# Patient Record
Sex: FEMALE | Race: BLACK OR AFRICAN AMERICAN | NOT HISPANIC OR LATINO | Employment: FULL TIME | URBAN - METROPOLITAN AREA
[De-identification: names, ages, dates, MRNs, and addresses within clinical notes are randomized per-mention and may not be internally consistent; named-entity substitution may affect disease eponyms.]

---

## 2017-12-29 ENCOUNTER — OFFICE VISIT (OUTPATIENT)
Dept: RETAIL CLINIC | Facility: CLINIC | Age: 57
End: 2017-12-29

## 2017-12-29 VITALS
RESPIRATION RATE: 16 BRPM | OXYGEN SATURATION: 98 % | HEIGHT: 67 IN | WEIGHT: 242 LBS | TEMPERATURE: 98.9 F | BODY MASS INDEX: 37.98 KG/M2 | HEART RATE: 85 BPM

## 2017-12-29 DIAGNOSIS — J40 BRONCHITIS: ICD-10-CM

## 2017-12-29 DIAGNOSIS — J01.41 ACUTE RECURRENT PANSINUSITIS: Primary | ICD-10-CM

## 2017-12-29 PROCEDURE — 99213 OFFICE O/P EST LOW 20 MIN: CPT | Performed by: NURSE PRACTITIONER

## 2017-12-29 RX ORDER — ALBUTEROL SULFATE 2.5 MG/3ML
2.5 SOLUTION RESPIRATORY (INHALATION) EVERY 4 HOURS PRN
COMMUNITY

## 2017-12-29 RX ORDER — ALBUTEROL SULFATE 90 UG/1
2 AEROSOL, METERED RESPIRATORY (INHALATION) EVERY 4 HOURS PRN
COMMUNITY

## 2017-12-29 RX ORDER — FLUTICASONE PROPIONATE 50 MCG
2 SPRAY, SUSPENSION (ML) NASAL NIGHTLY
Qty: 1 BOTTLE | Refills: 0 | Status: SHIPPED | OUTPATIENT
Start: 2017-12-29 | End: 2018-01-28

## 2017-12-29 RX ORDER — DEXTROMETHORPHAN HYDROBROMIDE AND PROMETHAZINE HYDROCHLORIDE 15; 6.25 MG/5ML; MG/5ML
5 SYRUP ORAL 4 TIMES DAILY PRN
Qty: 240 ML | Refills: 0 | Status: SHIPPED | OUTPATIENT
Start: 2017-12-29 | End: 2018-01-08

## 2017-12-29 RX ORDER — AZITHROMYCIN 250 MG/1
TABLET, FILM COATED ORAL
Qty: 6 TABLET | Refills: 0 | Status: SHIPPED | OUTPATIENT
Start: 2017-12-29 | End: 2018-03-30

## 2017-12-29 NOTE — PATIENT INSTRUCTIONS
Sinusitis, Adult  Sinusitis is soreness and inflammation of your sinuses. Sinuses are hollow spaces in the bones around your face. Your sinuses are located:  · Around your eyes.  · In the middle of your forehead.  · Behind your nose.  · In your cheekbones.  Your sinuses and nasal passages are lined with a stringy fluid (mucus). Mucus normally drains out of your sinuses. When your nasal tissues become inflamed or swollen, the mucus can become trapped or blocked so air cannot flow through your sinuses. This allows bacteria, viruses, and funguses to grow, which leads to infection.  Sinusitis can develop quickly and last for 7-10 days (acute) or for more than 12 weeks (chronic). Sinusitis often develops after a cold.  CAUSES  This condition is caused by anything that creates swelling in the sinuses or stops mucus from draining, including:  · Allergies.  · Asthma.  · Bacterial or viral infection.  · Abnormally shaped bones between the nasal passages.  · Nasal growths that contain mucus (nasal polyps).  · Narrow sinus openings.  · Pollutants, such as chemicals or irritants in the air.  · A foreign object stuck in the nose.  · A fungal infection. This is rare.  RISK FACTORS  The following factors may make you more likely to develop this condition:  · Having allergies or asthma.  · Having had a recent cold or respiratory tract infection.  · Having structural deformities or blockages in your nose or sinuses.  · Having a weak immune system.  · Doing a lot of swimming or diving.  · Overusing nasal sprays.  · Smoking.  SYMPTOMS  The main symptoms of this condition are pain and a feeling of pressure around the affected sinuses. Other symptoms include:  · Upper toothache.  · Earache.  · Headache.  · Bad breath.  · Decreased sense of smell and taste.  · A cough that may get worse at night.  · Fatigue.  · Fever.  · Thick drainage from your nose. The drainage is often green and it may contain pus (purulent).  · Stuffy nose or  congestion.  · Postnasal drip. This is when extra mucus collects in the throat or back of the nose.  · Swelling and warmth over the affected sinuses.  · Sore throat.  · Sensitivity to light.  DIAGNOSIS  This condition is diagnosed based on symptoms, a medical history, and a physical exam. To find out if your condition is acute or chronic, your health care provider may:  · Look in your nose for signs of nasal polyps.  · Tap over the affected sinus to check for signs of infection.  · View the inside of your sinuses using an imaging device that has a light attached (endoscope).  If your health care provider suspects that you have chronic sinusitis, you may also:  · Be tested for allergies.  · Have a sample of mucus taken from your nose (nasal culture) and checked for bacteria.  · Have a mucus sample examined to see if your sinusitis is related to an allergy.  If your sinusitis does not respond to treatment and it lasts longer than 8 weeks, you may have an MRI or CT scan to check your sinuses. These scans also help to determine how severe your infection is.  In rare cases, a bone biopsy may be done to rule out more serious types of fungal sinus disease.  TREATMENT  Treatment for sinusitis depends on the cause and whether your condition is chronic or acute. If a virus is causing your sinusitis, your symptoms will go away on their own within 10 days. You may be given medicines to relieve your symptoms, including:  · Topical nasal decongestants. They shrink swollen nasal passages and let mucus drain from your sinuses.  · Antihistamines. These drugs block inflammation that is triggered by allergies. This can help to ease swelling in your nose and sinuses.  · Topical nasal corticosteroids. These are nasal sprays that ease inflammation and swelling in your nose and sinuses.  · Nasal saline washes. These rinses can help to get rid of thick mucus in your nose.  If your condition is caused by bacteria, you will be given an  antibiotic medicine. If your condition is caused by a fungus, you will be given an antifungal medicine.  Surgery may be needed to correct underlying conditions, such as narrow nasal passages. Surgery may also be needed to remove polyps.  HOME CARE INSTRUCTIONS  Medicines  · Take, use, or apply over-the-counter and prescription medicines only as told by your health care provider. These may include nasal sprays.  · If you were prescribed an antibiotic medicine, take it as told by your health care provider. Do not stop taking the antibiotic even if you start to feel better.  Hydrate and Humidify  · Drink enough water to keep your urine clear or pale yellow. Staying hydrated will help to thin your mucus.  · Use a cool mist humidifier to keep the humidity level in your home above 50%.  · Inhale steam for 10-15 minutes, 3-4 times a day or as told by your health care provider. You can do this in the bathroom while a hot shower is running.  · Limit your exposure to cool or dry air.  Rest  · Rest as much as possible.  · Sleep with your head raised (elevated).  · Make sure to get enough sleep each night.  General Instructions  · Apply a warm, moist washcloth to your face 3-4 times a day or as told by your health care provider. This will help with discomfort.  · Wash your hands often with soap and water to reduce your exposure to viruses and other germs. If soap and water are not available, use hand .  · Do not smoke. Avoid being around people who are smoking (secondhand smoke).  · Keep all follow-up visits as told by your health care provider. This is important.  SEEK MEDICAL CARE IF:  · You have a fever.  · Your symptoms get worse.  · Your symptoms do not improve within 10 days.  SEEK IMMEDIATE MEDICAL CARE IF:  · You have a severe headache.  · You have persistent vomiting.  · You have pain or swelling around your face or eyes.  · You have vision problems.  · You develop confusion.  · Your neck is stiff.  · You have  trouble breathing.     This information is not intended to replace advice given to you by your health care provider. Make sure you discuss any questions you have with your health care provider.     Document Released: 12/18/2006 Document Revised: 04/10/2017 Document Reviewed: 10/12/2016  [employer] Interactive Patient Education ©2017 [employer]    Acute Bronchitis  Bronchitis is inflammation of the airways that extend from the windpipe into the lungs (bronchi). The inflammation often causes mucus to develop. This leads to a cough, which is the most common symptom of bronchitis.   In acute bronchitis, the condition usually develops suddenly and goes away over time, usually in a couple weeks. Smoking, allergies, and asthma can make bronchitis worse. Repeated episodes of bronchitis may cause further lung problems.   CAUSES  Acute bronchitis is most often caused by the same virus that causes a cold. The virus can spread from person to person (contagious) through coughing, sneezing, and touching contaminated objects.  SIGNS AND SYMPTOMS   · Cough.    · Fever.    · Coughing up mucus.    · Body aches.    · Chest congestion.    · Chills.    · Shortness of breath.    · Sore throat.    DIAGNOSIS   Acute bronchitis is usually diagnosed through a physical exam. Your health care provider will also ask you questions about your medical history. Tests, such as chest X-rays, are sometimes done to rule out other conditions.   TREATMENT   Acute bronchitis usually goes away in a couple weeks. Oftentimes, no medical treatment is necessary. Medicines are sometimes given for relief of fever or cough. Antibiotic medicines are usually not needed but may be prescribed in certain situations. In some cases, an inhaler may be recommended to help reduce shortness of breath and control the cough. A cool mist vaporizer may also be used to help thin bronchial secretions and make it easier to clear the chest.   HOME CARE INSTRUCTIONS  · Get plenty  of rest.    · Drink enough fluids to keep your urine clear or pale yellow (unless you have a medical condition that requires fluid restriction). Increasing fluids may help thin your respiratory secretions (sputum) and reduce chest congestion, and it will prevent dehydration.    · Take medicines only as directed by your health care provider.  · If you were prescribed an antibiotic medicine, finish it all even if you start to feel better.  · Avoid smoking and secondhand smoke. Exposure to cigarette smoke or irritating chemicals will make bronchitis worse. If you are a smoker, consider using nicotine gum or skin patches to help control withdrawal symptoms. Quitting smoking will help your lungs heal faster.    · Reduce the chances of another bout of acute bronchitis by washing your hands frequently, avoiding people with cold symptoms, and trying not to touch your hands to your mouth, nose, or eyes.    · Keep all follow-up visits as directed by your health care provider.    SEEK MEDICAL CARE IF:  Your symptoms do not improve after 1 week of treatment.   SEEK IMMEDIATE MEDICAL CARE IF:  · You develop an increased fever or chills.    · You have chest pain.    · You have severe shortness of breath.  · You have bloody sputum.    · You develop dehydration.  · You faint or repeatedly feel like you are going to pass out.  · You develop repeated vomiting.  · You develop a severe headache.  MAKE SURE YOU:   · Understand these instructions.  · Will watch your condition.  · Will get help right away if you are not doing well or get worse.     This information is not intended to replace advice given to you by your health care provider. Make sure you discuss any questions you have with your health care provider.     Document Released: 01/25/2006 Document Revised: 01/08/2016 Document Reviewed: 06/10/2014  [employer] Interactive Patient Education ©2017 [employer]

## 2017-12-29 NOTE — PROGRESS NOTES
Subjective   Rehana Navas is a 57 y.o. female.     Sinusitis   This is a new problem. The current episode started in the past 7 days. The problem has been gradually worsening since onset. There has been no fever. Her pain is at a severity of 0/10. She is experiencing no pain. Associated symptoms include coughing, sinus pressure and sneezing. Pertinent negatives include no chills, congestion (green), diaphoresis, ear pain, headaches, hoarse voice, neck pain, shortness of breath, sore throat or swollen glands. Past treatments include lying down. The treatment provided no relief.        No current outpatient prescriptions on file prior to visit.     No current facility-administered medications on file prior to visit.        Allergies   Allergen Reactions   • Codeine    • Ibuprofen        Past Medical History:   Diagnosis Date   • Allergic    • Asthma        Past Surgical History:   Procedure Laterality Date   • BUNIONECTOMY     • HYSTERECTOMY     • TONSILLECTOMY         History reviewed. No pertinent family history.    Social History     Social History   • Marital status: Single     Spouse name: N/A   • Number of children: N/A   • Years of education: N/A     Occupational History   • Not on file.     Social History Main Topics   • Smoking status: Never Smoker   • Smokeless tobacco: Never Used   • Alcohol use No      Comment: socially   • Drug use: No   • Sexual activity: Not on file     Other Topics Concern   • Not on file     Social History Narrative   • No narrative on file       Review of Systems   Constitutional: Positive for activity change, appetite change and fatigue. Negative for chills, diaphoresis and fever.   HENT: Positive for rhinorrhea, sinus pressure and sneezing. Negative for congestion (green), ear pain, hoarse voice and sore throat.    Eyes: Negative for pain, discharge and itching.   Respiratory: Positive for cough. Negative for shortness of breath.    Musculoskeletal: Negative for neck pain.  "  Allergic/Immunologic: Positive for environmental allergies.   Neurological: Negative for headaches.       Pulse 85  Temp 98.9 °F (37.2 °C) (Oral)   Resp 16  Ht 170.2 cm (67\")  Wt 110 kg (242 lb)  SpO2 98%  BMI 37.9 kg/m2    Objective   Physical Exam   Constitutional: She is oriented to person, place, and time. She appears well-developed and well-nourished. She is cooperative. She appears ill.   HENT:   Head: Normocephalic and atraumatic.   Right Ear: External ear and ear canal normal. Tympanic membrane is injected.   Left Ear: External ear and ear canal normal. Tympanic membrane is injected.   Nose: Right sinus exhibits maxillary sinus tenderness and frontal sinus tenderness. Left sinus exhibits maxillary sinus tenderness and frontal sinus tenderness.   Mouth/Throat: Uvula is midline and mucous membranes are normal. Oropharyngeal exudate and posterior oropharyngeal erythema present.   Eyes: Conjunctivae and lids are normal.   Cardiovascular: Normal heart sounds.    Pulmonary/Chest: Effort normal and breath sounds normal.   Lymphadenopathy:     She has cervical adenopathy.   Neurological: She is alert and oriented to person, place, and time.   Skin: Skin is warm and dry. No rash noted.   Psychiatric: She has a normal mood and affect. Her speech is normal and behavior is normal.         Assessment/Plan   Rehana was seen today for sinusitis.    Diagnoses and all orders for this visit:    Acute recurrent pansinusitis  -     azithromycin (ZITHROMAX Z-DANNY) 250 MG tablet; Take 2 tablets the first day, then 1 tablet daily for 4 days.  -     fluticasone (FLONASE) 50 MCG/ACT nasal spray; 2 sprays into each nostril Every Night for 30 days. Administer 2 sprays in each nostril for each dose.  -     promethazine-dextromethorphan (PROMETHAZINE-DM) 6.25-15 MG/5ML syrup; Take 5 mL by mouth 4 (Four) Times a Day As Needed for Cough for up to 10 days.    Bronchitis  -     azithromycin (ZITHROMAX Z-DANNY) 250 MG tablet; Take 2 " tablets the first day, then 1 tablet daily for 4 days.  -     fluticasone (FLONASE) 50 MCG/ACT nasal spray; 2 sprays into each nostril Every Night for 30 days. Administer 2 sprays in each nostril for each dose.  -     promethazine-dextromethorphan (PROMETHAZINE-DM) 6.25-15 MG/5ML syrup; Take 5 mL by mouth 4 (Four) Times a Day As Needed for Cough for up to 10 days.        No results found for this or any previous visit.    Follow up with PCP or go to the nearest emergency room if symptoms worsen or fail to improve.

## 2018-03-30 ENCOUNTER — OFFICE VISIT (OUTPATIENT)
Dept: RETAIL CLINIC | Facility: CLINIC | Age: 58
End: 2018-03-30

## 2018-03-30 VITALS
WEIGHT: 245.6 LBS | OXYGEN SATURATION: 98 % | TEMPERATURE: 98.8 F | BODY MASS INDEX: 38.55 KG/M2 | RESPIRATION RATE: 20 BRPM | HEIGHT: 67 IN | HEART RATE: 70 BPM

## 2018-03-30 DIAGNOSIS — H65.93 OTITIS MEDIA WITH EFFUSION, BILATERAL: ICD-10-CM

## 2018-03-30 DIAGNOSIS — J06.9 VIRAL UPPER RESPIRATORY TRACT INFECTION: Primary | ICD-10-CM

## 2018-03-30 PROCEDURE — 99213 OFFICE O/P EST LOW 20 MIN: CPT | Performed by: NURSE PRACTITIONER

## 2018-03-30 RX ORDER — PSEUDOEPHEDRINE HCL 120 MG/1
120 TABLET, FILM COATED, EXTENDED RELEASE ORAL EVERY MORNING
Qty: 15 TABLET | Refills: 0 | Status: SHIPPED | OUTPATIENT
Start: 2018-03-30 | End: 2018-12-19

## 2018-03-30 RX ORDER — FLUTICASONE PROPIONATE 50 MCG
2 SPRAY, SUSPENSION (ML) NASAL DAILY
Qty: 1 BOTTLE | Refills: 0 | Status: SHIPPED | OUTPATIENT
Start: 2018-03-30

## 2018-03-30 NOTE — PROGRESS NOTES
"Sebastián Navas is a 57 y.o. female.   Chief Complaint   Patient presents with   • Earache      Earache    There is pain in both ears. This is a new problem. The current episode started in the past 7 days (3 days). The problem occurs every few hours. The problem has been waxing and waning. There has been no fever. The pain is at a severity of 2/10. Associated symptoms include a sore throat. Pertinent negatives include no ear discharge, rash or rhinorrhea. She has tried nothing for the symptoms. The treatment provided no relief.        The following portions of the patient's history were reviewed and updated as appropriate: allergies, current medications, past family history, past medical history, past social history, past surgical history and problem list.    Current Outpatient Prescriptions:   •  albuterol (PROVENTIL HFA;VENTOLIN HFA) 108 (90 Base) MCG/ACT inhaler, Inhale 2 puffs Every 4 (Four) Hours As Needed for Wheezing., Disp: , Rfl:   •  albuterol (PROVENTIL) (2.5 MG/3ML) 0.083% nebulizer solution, Take 2.5 mg by nebulization Every 4 (Four) Hours As Needed for Wheezing., Disp: , Rfl:   •  Mometasone Furo-Formoterol Fum (DULERA IN), Inhale., Disp: , Rfl:   •  fluticasone (FLONASE) 50 MCG/ACT nasal spray, 2 sprays into each nostril Daily., Disp: 1 bottle, Rfl: 0  •  pseudoephedrine (SUDAFED 12 HOUR) 120 MG 12 hr tablet, Take 1 tablet by mouth Every Morning. Take every morning as needed for congestion, Disp: 15 tablet, Rfl: 0    Review of Systems   Constitutional: Negative.    HENT: Positive for ear pain and sore throat. Negative for congestion, ear discharge, postnasal drip, rhinorrhea, sinus pain, sinus pressure and trouble swallowing.    Eyes: Negative.    Respiratory: Negative.    Cardiovascular: Negative.    Gastrointestinal: Negative.    Skin: Negative for rash.     Pulse 70   Temp 98.8 °F (37.1 °C) (Oral)   Resp 20   Ht 170.2 cm (67\")   Wt 111 kg (245 lb 9.6 oz)   SpO2 98%   BMI 38.47 " kg/m²     Objective   Allergies   Allergen Reactions   • Ibuprofen Shortness Of Breath   • Codeine Hives       Physical Exam   Constitutional: She is oriented to person, place, and time. She appears well-developed and well-nourished. No distress.   HENT:   Head: Normocephalic.   Right Ear: Hearing, external ear and ear canal normal. A middle ear effusion is present.   Left Ear: Hearing, external ear and ear canal normal. A middle ear effusion is present.   Nose: Mucosal edema present. Right sinus exhibits no maxillary sinus tenderness and no frontal sinus tenderness. Left sinus exhibits no maxillary sinus tenderness and no frontal sinus tenderness.   Mouth/Throat: Oropharynx is clear and moist.   Eyes: Conjunctivae are normal.   Neck: Normal range of motion. Neck supple. No JVD present. No tracheal deviation present. No thyromegaly present.   Cardiovascular: Normal rate, regular rhythm and normal heart sounds.    Pulmonary/Chest: Effort normal and breath sounds normal. No stridor. No respiratory distress. She has no wheezes. She has no rales.   Lymphadenopathy:     She has no cervical adenopathy.   Neurological: She is alert and oriented to person, place, and time.   Skin: Skin is warm. Capillary refill takes less than 2 seconds. She is not diaphoretic.   Psychiatric: She has a normal mood and affect. Her behavior is normal.   Vitals reviewed.      Assessment/Plan   Rehana was seen today for earache.    Diagnoses and all orders for this visit:    Viral upper respiratory tract infection    Otitis media with effusion, bilateral    Other orders  -     pseudoephedrine (SUDAFED 12 HOUR) 120 MG 12 hr tablet; Take 1 tablet by mouth Every Morning. Take every morning as needed for congestion  -     fluticasone (FLONASE) 50 MCG/ACT nasal spray; 2 sprays into each nostril Daily.           An After Visit Summary was printed, reviewed, and given to the patient. Understanding verbalized and agrees with treatment plan.  If no  improvement or becomes worse, follow up with primary or go to [facility].          March 30, 2018 7:01 PM

## 2018-03-30 NOTE — PATIENT INSTRUCTIONS
"Otitis Media With Effusion  Otitis media with effusion is the presence of fluid in the middle ear. This is a common problem in children, which often follows ear infections. It may be present for weeks or longer after the infection. Unlike an acute ear infection, otitis media with effusion refers only to fluid behind the ear drum and not infection. Children with repeated ear and sinus infections and allergy problems are the most likely to get otitis media with effusion.  CAUSES   The most frequent cause of the fluid buildup is dysfunction of the eustachian tubes. These are the tubes that drain fluid in the ears to the back of the nose (nasopharynx).  SYMPTOMS   · The main symptom of this condition is hearing loss. As a result, you or your child may:  ¨ Listen to the TV at a loud volume.  ¨ Not respond to questions.  ¨ Ask \"what\" often when spoken to.  ¨ Mistake or confuse one sound or word for another.  · There may be a sensation of fullness or pressure but usually not pain.  DIAGNOSIS   · Your health care provider will diagnose this condition by examining you or your child's ears.  · Your health care provider may test the pressure in you or your child's ear with a tympanometer.  · A hearing test may be conducted if the problem persists.  TREATMENT   · Treatment depends on the duration and the effects of the effusion.  · Antibiotics, decongestants, nose drops, and cortisone-type drugs (tablets or nasal spray) may not be helpful.  · Children with persistent ear effusions may have delayed language or behavioral problems. Children at risk for developmental delays in hearing, learning, and speech may require referral to a specialist earlier than children not at risk.  · You or your child's health care provider may suggest a referral to an ear, nose, and throat surgeon for treatment. The following may help restore normal hearing:  ¨ Drainage of fluid.  ¨ Placement of ear tubes (tympanostomy tubes).  ¨ Removal of adenoids " "(adenoidectomy).  HOME CARE INSTRUCTIONS   · Avoid secondhand smoke.  · Infants who are  are less likely to have this condition.  · Avoid feeding infants while they are lying flat.  · Avoid known environmental allergens.  · Avoid people who are sick.  SEEK MEDICAL CARE IF:   · Hearing is not better in 3 months.  · Hearing is worse.  · Ear pain.  · Drainage from the ear.  · Dizziness.  MAKE SURE YOU:   · Understand these instructions.  · Will watch your condition.  · Will get help right away if you are not doing well or get worse.  This information is not intended to replace advice given to you by your health care provider. Make sure you discuss any questions you have with your health care provider.  Document Released: 01/25/2006 Document Revised: 01/08/2016 Document Reviewed: 07/15/2014  [employer] Interactive Patient Education © 2017 [employer]          Upper Respiratory Infection, Adult  Most upper respiratory infections (URIs) are a viral infection of the air passages leading to the lungs. A URI affects the nose, throat, and upper air passages. The most common type of URI is nasopharyngitis and is typically referred to as \"the common cold.\"  URIs run their course and usually go away on their own. Most of the time, a URI does not require medical attention, but sometimes a bacterial infection in the upper airways can follow a viral infection. This is called a secondary infection. Sinus and middle ear infections are common types of secondary upper respiratory infections.  Bacterial pneumonia can also complicate a URI. A URI can worsen asthma and chronic obstructive pulmonary disease (COPD). Sometimes, these complications can require emergency medical care and may be life threatening.  What are the causes?  Almost all URIs are caused by viruses. A virus is a type of germ and can spread from one person to another.  What increases the risk?  You may be at risk for a URI if:  · You smoke.  · You have chronic " heart or lung disease.  · You have a weakened defense (immune) system.  · You are very young or very old.  · You have nasal allergies or asthma.  · You work in crowded or poorly ventilated areas.  · You work in health care facilities or schools.  What are the signs or symptoms?  Symptoms typically develop 2-3 days after you come in contact with a cold virus. Most viral URIs last 7-10 days. However, viral URIs from the influenza virus (flu virus) can last 14-18 days and are typically more severe. Symptoms may include:  · Runny or stuffy (congested) nose.  · Sneezing.  · Cough.  · Sore throat.  · Headache.  · Fatigue.  · Fever.  · Loss of appetite.  · Pain in your forehead, behind your eyes, and over your cheekbones (sinus pain).  · Muscle aches.  How is this diagnosed?  Your health care provider may diagnose a URI by:  · Physical exam.  · Tests to check that your symptoms are not due to another condition such as:  ¨ Strep throat.  ¨ Sinusitis.  ¨ Pneumonia.  ¨ Asthma.  How is this treated?  A URI goes away on its own with time. It cannot be cured with medicines, but medicines may be prescribed or recommended to relieve symptoms. Medicines may help:  · Reduce your fever.  · Reduce your cough.  · Relieve nasal congestion.  Follow these instructions at home:  · Take medicines only as directed by your health care provider.  · Gargle warm saltwater or take cough drops to comfort your throat as directed by your health care provider.  · Use a warm mist humidifier or inhale steam from a shower to increase air moisture. This may make it easier to breathe.  · Drink enough fluid to keep your urine clear or pale yellow.  · Eat soups and other clear broths and maintain good nutrition.  · Rest as needed.  · Return to work when your temperature has returned to normal or as your health care provider advises. You may need to stay home longer to avoid infecting others. You can also use a face mask and careful hand washing to prevent  spread of the virus.  · Increase the usage of your inhaler if you have asthma.  · Do not use any tobacco products, including cigarettes, chewing tobacco, or electronic cigarettes. If you need help quitting, ask your health care provider.  How is this prevented?  The best way to protect yourself from getting a cold is to practice good hygiene.  · Avoid oral or hand contact with people with cold symptoms.  · Wash your hands often if contact occurs.  There is no clear evidence that vitamin C, vitamin E, echinacea, or exercise reduces the chance of developing a cold. However, it is always recommended to get plenty of rest, exercise, and practice good nutrition.  Contact a health care provider if:  · You are getting worse rather than better.  · Your symptoms are not controlled by medicine.  · You have chills.  · You have worsening shortness of breath.  · You have brown or red mucus.  · You have yellow or brown nasal discharge.  · You have pain in your face, especially when you bend forward.  · You have a fever.  · You have swollen neck glands.  · You have pain while swallowing.  · You have white areas in the back of your throat.  Get help right away if:  · You have severe or persistent:  ¨ Headache.  ¨ Ear pain.  ¨ Sinus pain.  ¨ Chest pain.  · You have chronic lung disease and any of the following:  ¨ Wheezing.  ¨ Prolonged cough.  ¨ Coughing up blood.  ¨ A change in your usual mucus.  · You have a stiff neck.  · You have changes in your:  ¨ Vision.  ¨ Hearing.  ¨ Thinking.  ¨ Mood.  This information is not intended to replace advice given to you by your health care provider. Make sure you discuss any questions you have with your health care provider.  Document Released: 06/13/2002 Document Revised: 08/20/2017 Document Reviewed: 03/25/2015  [employer] Interactive Patient Education © 2017 [employer]

## 2018-12-19 ENCOUNTER — OFFICE VISIT (OUTPATIENT)
Dept: RETAIL CLINIC | Facility: CLINIC | Age: 58
End: 2018-12-19

## 2018-12-19 VITALS
WEIGHT: 250 LBS | DIASTOLIC BLOOD PRESSURE: 78 MMHG | BODY MASS INDEX: 39.24 KG/M2 | RESPIRATION RATE: 18 BRPM | HEIGHT: 67 IN | OXYGEN SATURATION: 98 % | SYSTOLIC BLOOD PRESSURE: 124 MMHG | TEMPERATURE: 99.2 F | HEART RATE: 99 BPM

## 2018-12-19 DIAGNOSIS — Z91.09 ENVIRONMENTAL ALLERGIES: Primary | ICD-10-CM

## 2018-12-19 PROCEDURE — 99213 OFFICE O/P EST LOW 20 MIN: CPT | Performed by: NURSE PRACTITIONER

## 2018-12-19 RX ORDER — DEXTROMETHORPHAN HYDROBROMIDE AND PROMETHAZINE HYDROCHLORIDE 15; 6.25 MG/5ML; MG/5ML
5 SYRUP ORAL NIGHTLY PRN
Qty: 120 ML | Refills: 0 | Status: SHIPPED | OUTPATIENT
Start: 2018-12-19 | End: 2018-12-26

## 2018-12-19 RX ORDER — MONTELUKAST SODIUM 10 MG/1
10 TABLET ORAL NIGHTLY
Qty: 30 TABLET | Refills: 0 | Status: SHIPPED | OUTPATIENT
Start: 2018-12-19

## 2018-12-19 RX ORDER — INFLUENZA A VIRUS A/SINGAPORE/GP1908/2015 IVR-180A (H1N1) ANTIGEN (PROPIOLACTONE INACTIVATED), INFLUENZA A VIRUS A/SINGAPORE/INFIMH-16-0019/2016 IVR-186 (H3N2) ANTIGEN (PROPIOLACTONE INACTIVATED), INFLUENZA B VIRUS B/MARYLAND/15/2016 ANTIGEN (PROPIOLACTONE INACTIVATED), AND INFLUENZA B VIRUS B/PHUKET/3073/2013 BVR-1B ANTIGEN (PROPIOLACTONE INACTIVATED) 15; 15; 15; 15 UG/.5ML; UG/.5ML; UG/.5ML; UG/.5ML
INJECTION, SUSPENSION INTRAMUSCULAR
Refills: 0 | COMMUNITY
Start: 2018-10-03

## 2018-12-19 NOTE — PATIENT INSTRUCTIONS
Allergies  An allergy is when your body reacts to a substance in a way that is not normal. An allergic reaction can happen after you:  · Eat something.  · Breathe in something.  · Touch something.    You can be allergic to:  · Things that are only around during certain seasons, like molds and pollens.  · Foods.  · Drugs.  · Insects.  · Animal dander.    What are the signs or symptoms?  · Puffiness (swelling). This may happen on the lips, face, tongue, mouth, or throat.  · Sneezing.  · Coughing.  · Breathing loudly (wheezing).  · Stuffy nose.  · Tingling in the mouth.  · A rash.  · Itching.  · Itchy, red, puffy areas of skin (hives).  · Watery eyes.  · Throwing up (vomiting).  · Watery poop (diarrhea).  · Dizziness.  · Feeling faint or fainting.  · Trouble breathing or swallowing.  · A tight feeling in the chest.  · A fast heartbeat.  How is this diagnosed?  Allergies can be diagnosed with:  · A medical and family history.  · Skin tests.  · Blood tests.  · A food diary. A food diary is a record of all the foods, drinks, and symptoms you have each day.  · The results of an elimination diet. This diet involves making sure not to eat certain foods and then seeing what happens when you start eating them again.    How is this treated?  There is no cure for allergies, but allergic reactions can be treated with medicine. Severe reactions usually need to be treated at a hospital.  How is this prevented?  The best way to prevent an allergic reaction is to avoid the thing you are allergic to. Allergy shots and medicines can also help prevent reactions in some cases.  This information is not intended to replace advice given to you by your health care provider. Make sure you discuss any questions you have with your health care provider.  Document Released: 04/14/2014 Document Revised: 08/14/2017 Document Reviewed: 09/29/2015  [employer] Interactive Patient Education © 2018 [employer]

## 2018-12-19 NOTE — PROGRESS NOTES
Subjective   Rehana Navas is a 58 y.o. female.   Chief Complaint   Patient presents with   • Nasal Congestion      57 yo female presents with complaint of allergies, nasal congestion alternating with runny nose and cough at night.  Reports ran out of [employer].  Refer to HPI/ROS for additional information.         The following portions of the patient's history were reviewed and updated as appropriate: allergies, current medications, past family history, past medical history, past social history, past surgical history and problem list.    Current Outpatient Medications:   •  albuterol (PROVENTIL HFA;VENTOLIN HFA) 108 (90 Base) MCG/ACT inhaler, Inhale 2 puffs Every 4 (Four) Hours As Needed for Wheezing., Disp: , Rfl:   •  albuterol (PROVENTIL) (2.5 MG/3ML) 0.083% nebulizer solution, Take 2.5 mg by nebulization Every 4 (Four) Hours As Needed for Wheezing., Disp: , Rfl:   •  fluticasone (FLONASE) 50 MCG/ACT nasal spray, 2 sprays into each nostril Daily., Disp: 1 bottle, Rfl: 0  •  AFLURIA QUADRIVALENT 0.5 ML suspension prefilled syringe injection, inject 0.5 milliliter intramuscularly, Disp: , Rfl: 0  •  montelukast (SINGULAIR) 10 MG tablet, Take 1 tablet by mouth Every Night., Disp: 30 tablet, Rfl: 0  •  promethazine-dextromethorphan (PROMETHAZINE-DM) 6.25-15 MG/5ML syrup, Take 5 mL by mouth At Night As Needed for Cough for up to 7 days., Disp: 120 mL, Rfl: 0    Review of Systems   Constitutional: Negative.    HENT: Positive for congestion, postnasal drip and rhinorrhea. Negative for sinus pressure, sinus pain, sore throat and trouble swallowing.    Eyes: Negative.    Respiratory: Positive for cough. Negative for apnea, choking, chest tightness, shortness of breath, wheezing and stridor.    Cardiovascular: Negative.    Gastrointestinal: Negative.    Musculoskeletal: Negative.    Skin: Negative.    Hematological: Negative.    Psychiatric/Behavioral: Negative.      /78 (BP Location: Left arm, Patient Position:  "Sitting)   Pulse 99   Temp 99.2 °F (37.3 °C) (Oral)   Resp 18   Ht 170.2 cm (67\")   Wt 113 kg (250 lb)   SpO2 98%   BMI 39.16 kg/m²     Objective   Allergies   Allergen Reactions   • Ibuprofen Shortness Of Breath   • Codeine Hives       Physical Exam   Constitutional: She is oriented to person, place, and time. She appears well-developed and well-nourished. No distress.   HENT:   Head: Normocephalic.   Right Ear: Hearing, tympanic membrane, external ear and ear canal normal.   Left Ear: Hearing, tympanic membrane, external ear and ear canal normal.   Nose: Rhinorrhea present. No mucosal edema. Right sinus exhibits no maxillary sinus tenderness and no frontal sinus tenderness. Left sinus exhibits no maxillary sinus tenderness and no frontal sinus tenderness.   Mouth/Throat: Uvula is midline, oropharynx is clear and moist and mucous membranes are normal.   Eyes: Conjunctivae are normal.   Neck: Normal range of motion. Neck supple. No JVD present. No tracheal deviation present. No thyromegaly present.   Cardiovascular: Normal rate, regular rhythm and normal heart sounds.   Pulmonary/Chest: Effort normal and breath sounds normal.   Neurological: She is alert and oriented to person, place, and time.   Skin: Skin is warm and dry. Capillary refill takes less than 2 seconds. She is not diaphoretic.   Psychiatric: She has a normal mood and affect. Her behavior is normal. Judgment and thought content normal.   Vitals reviewed.      Assessment/Plan   Rehana was seen today for nasal congestion.    Diagnoses and all orders for this visit:    Environmental allergies    Other orders  -     montelukast (SINGULAIR) 10 MG tablet; Take 1 tablet by mouth Every Night.  -     promethazine-dextromethorphan (PROMETHAZINE-DM) 6.25-15 MG/5ML syrup; Take 5 mL by mouth At Night As Needed for Cough for up to 7 days.           An After Visit Summary was printed, reviewed, and given to the patient. Understanding verbalized and agrees " with treatment plan.  If no improvement or becomes worse, follow up with primary or go to [facility].           December 19, 2018 6:15 PM

## 2019-07-02 ENCOUNTER — TRANSCRIBE ORDERS (OUTPATIENT)
Dept: PHYSICAL THERAPY | Facility: HOSPITAL | Age: 59
End: 2019-07-02

## 2019-07-02 DIAGNOSIS — M72.2 PLANTAR FASCIAL FIBROMATOSIS: Primary | ICD-10-CM

## 2019-07-03 ENCOUNTER — HOSPITAL ENCOUNTER (OUTPATIENT)
Dept: PHYSICAL THERAPY | Facility: HOSPITAL | Age: 59
Setting detail: THERAPIES SERIES
Discharge: HOME | End: 2019-07-03

## 2019-07-03 DIAGNOSIS — M72.2 PLANTAR FASCIAL FIBROMATOSIS OF LEFT FOOT: Primary | ICD-10-CM

## 2019-07-03 PROCEDURE — 97161 PT EVAL LOW COMPLEX 20 MIN: CPT | Performed by: PHYSICAL THERAPIST

## 2019-07-03 NOTE — THERAPY EVALUATION
"    Outpatient Physical Therapy Ortho Initial Evaluation  [facility]     Patient Name: Rehana Navas  : 1960  MRN: [id number]  Today's Date: 7/3/2019      Visit Date: 2019    There is no problem list on file for this patient.       Past Medical History:   Diagnosis Date   • Allergic    • Asthma         Past Surgical History:   Procedure Laterality Date   • BUNIONECTOMY     • HYSTERECTOMY     • TONSILLECTOMY         Visit Dx:     ICD-10-CM ICD-9-CM   1. Plantar fascial fibromatosis of left foot M72.2 728.71         Patient History     Row Name 19 0800             History    Chief Complaint  Pain  -CP      Type of Pain  Foot pain  -CP      Date Current Problem(s) Began  19  -CP      Brief Description of Current Complaint  Pt states she works as a , fell in  with known right knee injury of \"torn meniscus\", treated conservatively. She states she started walking different d/t right knee pain, and began to notice increased left foot pain the past year, lateral heel and burning into Achilles tendon intermittently. Increased pain with closed toe shoes due to shoe rubbing heel. Pt has been using topical to left heel, but not consistent use. Has f/u with DPM at end of July.   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 Pt has full flight of stairs at home, states has her elderly mother living with her who is indep with care. Pt states she enjoys aquatics twice a week for improved knee mobility.   -CP      Patient seeing anyone else for problem(s)?  Chase Deutsch DPM   -CP      How has patient tried to help current problem?  custom orthotic, topical   -CP      What clinical tests have you had for " "this problem?  X-ray  -CP      Results of Clinical Tests  states performed at Dr. Deutsch office, unknown results; pt reports \"lots of arthritis in foot\"   -CP      History of Previous Related Injuries  reports h/o bunionectomy to left great toe.   -CP      Are you or can you be pregnant  No  -CP         Pain     Pain Location  Foot  -CP      Pain at Present  1  -CP      Pain at Best  1  -CP      Pain at Worst  8  -CP      Pain Frequency  Intermittent  -CP      Pain Description  Aching;Dull  -CP      Is your sleep disturbed?  No  -CP      Is medication used to assist with sleep?  No  -CP      What position do you sleep in?  Supine  -CP      Difficulties at work?  off work for the summer; however reports will return early August.   -CP         Fall Risk Assessment    Any falls in the past year:  No  -CP         Daily Activities    Primary Language  English  -CP      Are you able to read  Yes  -CP      Are you able to write  Yes  -CP      Barriers to learning  Visual glasses  -CP      Pt Participated in POC and Goals  Yes  -CP         Safety    Are you being hurt, hit, or frightened by anyone at home or in your life?  No  -CP        User Key  (r) = Recorded By, (t) = Taken By, (c) = Cosigned By    Initials Name Provider Type    CP Perkins, Corinne E, PT Physical Therapist          PT Ortho     Row Name 07/03/19 0800       Subjective Comments    Subjective Comments  Pt presents with c/o left foot pain.  -CP       Subjective Pain    Able to rate subjective pain?  yes  -CP    Pre-Treatment Pain Level  1  -CP    Post-Treatment Pain Level  1  -CP       Special Tests/Palpation    Special Tests/Palpation  Ankle/Foot  -CP       Foot/Ankle Palpation    Foot/Ankle Palpation?  Yes  -CP    Plantar Fascia  Tender;Left:  -CP    Peroneals  Left:;Tender  -CP       Ankle Accessory Motions    Ankle Accessory Motions Tested?  Yes  -CP       Ankle/Foot Special Tests    Chaudhry test (Achilles’ tendon rupture)  Negative  -CP    Inversion " Stress Test  Negative  -CP    Eversion Stress Test  Negative  -CP    Ankle Special Tests Comments  improved with subtalar distraction  -CP       General ROM    RT Lower Ext  Rt Ankle Dorsiflexion;Rt Ankle Plantarflexion;Rt Ankle Inversion;Rt Ankle Eversion  -CP    LT Lower Ext  Lt Ankle Dorsiflexion;Lt Ankle Plantarflexion;Lt Ankle Inversion;Lt Ankle Eversion  -CP       Right Lower Ext    Rt Ankle Dorsiflexion AROM  10  -CP    Rt Ankle Dorsiflexion PROM  18  -CP    Rt Ankle Plantarflexion AROM  52  -CP    Rt Ankle Plantarflexion PROM  60  -CP    Rt Ankle Inversion AROM  23  -CP    Rt Ankle Inversion PROM  25  -CP    Rt Ankle Eversion AROM  18  -CP    Rt Ankle Eversion PROM  20  -CP       Left Lower Ext    Lt Ankle Dorsiflexion AROM  8  -CP    Lt Ankle Dorsiflexion PROM  12  -CP    Lt Ankle Plantarflexion AROM  50  -CP    Lt Ankle Plantarflexion PROM  54  -CP    Lt Ankle Inversion AROM  20  -CP    Lt Ankle Inversion PROM  WFL  -CP    Lt Ankle Eversion AROM  18  -CP    Lt Ankle Eversion PROM  WFL  -CP       MMT (Manual Muscle Testing)    General MMT Comments  RLE generalized 4+/5 hip, limited knee d/t injury 4/5; LLE MMT ankle 3-/5 DF, 4/5 PF  -CP       Sensation    Light Touch  No apparent deficits  -CP       Flexibility    Flexibility Tested?  Lower Extremity  -CP       Lower Extremity Flexibility    Hamstrings  Left:;Mildly limited  -CP    Gastrocnemius  Left:;Moderately limited  -CP    Soleus  Left:;Mildly limited  -CP       Orthotic/Prosthetic Management    Orthosis Location  -- waiting to receive custom orthotics  -CP       Gait/Stairs Assessment/Training    Number of Steps (Stairs)  12  -CP    Ascending Technique (Stairs)  step-over-step  -CP    Descending Technique (Stairs)  step-to-step d/t R knee pain, leads with RLE  -CP    Comment (Gait/Stairs)  Pt ambulated with step through gait pattern, decreased push off L foot, antalgic gait pattern.   -CP      User Key  (r) = Recorded By, (t) = Taken By, (c) =  Cosigned By    Initials Name Provider Type    CP Perkins, Corinne E, PT Physical Therapist                      Therapy Education  Education Details: HEP: gastroc, soleus, and great toe stretch, along with ankle AROM, ankle circles. Pt educated on POC and PT.   Given: HEP, Symptoms/condition management, Pain management  Program: New  How Provided: Verbal, Demonstration  Provided to: Patient  Level of Understanding: Verbalized, Demonstrated     PT OP Goals     Row Name 07/03/19 0800          PT Short Term Goals    STG Date to Achieve  07/17/19  -CP     STG 1  Patient reports foot/ankle pain is reduced by at least 25%.  -CP     STG 1 Progress  New  -CP     STG 2  Patient is independent with HEP for flexibility and strengthening.  -CP     STG 2 Progress  New  -CP     STG 3  Pt will be educated on custom orthotic and night splint option for improved medial arch support ambulating and stretch at night.   -CP     STG 3 Progress  New  -CP        Long Term Goals    LTG Date to Achieve  08/02/19  -CP     LTG 1  LEFS score is improved by at least 10 points to demonstrated improved functional mobility.  -CP     LTG 1 Progress  New  -CP     LTG 2  Patient will demonstrate independent HEP progressed for closed chain strength, proprioception, balance and agility with minimal or no compensations or exacerbations  -CP     LTG 2 Progress  New  -CP     LTG 3  Pt will demonstrate functional left ankle AROM without increased pain for improved mobility.   -CP     LTG 3 Progress  New  -CP        Time Calculation    PT Goal Re-Cert Due Date  10/01/19  -CP       User Key  (r) = Recorded By, (t) = Taken By, (c) = Cosigned By    Initials Name Provider Type    CP Perkins, Corinne E, PT Physical Therapist          PT Assessment/Plan     Row Name 07/03/19 0800          PT Assessment    Functional Limitations  Impaired gait;Limitation in home management;Limitations in community activities;Performance in leisure activities  -CP     Impairments   Gait;Balance;Impaired flexibility;Range of motion;Pain;Muscle strength;Joint mobility  -CP     Assessment Comments  Pt is a 59 yo female referred to PT for left plantar fascial fibromatosis who demonstrated signs and symptoms consistent with diagnosis, demonstrated limited left ankle AROM and impaired gait. Pt would benefit from skilled PT, including ther exercises, NMRE, gait training with custom orthotics, manual and modalities as indicated to decrease pain.   -CP     Please refer to paper survey for additional self-reported information  Yes  -CP     Rehab Potential  Good  -CP     Patient/caregiver participated in establishment of treatment plan and goals  Yes  -CP     Patient would benefit from skilled therapy intervention  Yes  -CP        PT Plan    PT Frequency  2x/week  -CP     Predicted Duration of Therapy Intervention (Therapy Eval)  8-10 visits  -CP     Planned CPT's?  PT EVAL LOW COMPLEXITY: 19885;PT RE-EVAL: 71902;PT THER PROC EA 15 MIN: 49875;PT MANUAL THERAPY EA 15 MIN: 29087;PT NEUROMUSC RE-EDUCATION EA 15 MIN: 93955;PT GAIT TRAINING EA 15 MIN: 94571;PT ELECTRICAL STIM UNATTEND: ;PT ULTRASOUND EA 15 MIN: 52292;PT HOT/COLD PACK WC NONMCARE: 12904;PT IONTOPHORESIS EA 15 MIN: 15561  -CP     PT Plan Comments  Recommend skilled PT as noted above to improve functional mobility and decrease pain. Assess compliance with initial HEP/stretching. Could trial ionto next visit, pending pain symptoms. Could discuss night splint next visit as well.   -CP       User Key  (r) = Recorded By, (t) = Taken By, (c) = Cosigned By    Initials Name Provider Type    CP Perkins, Corinne E, PT Physical Therapist            Exercises     Row Name 07/03/19 0800             Subjective Comments    Subjective Comments  Pt presents with c/o left foot pain.  -CP         Subjective Pain    Able to rate subjective pain?  yes  -CP      Pre-Treatment Pain Level  1  -CP      Post-Treatment Pain Level  1  -CP        User Key  (r) =  Recorded By, (t) = Taken By, (c) = Cosigned By    Initials Name Provider Type    CP Perkins, Corinne E, PT Physical Therapist                        Outcome Measure Options: Lower Extremity Functional Scale (LEFS)  Lower Extremity Functional Index  Any of your usual work, housework or school activities: No difficulty  Your usual hobbies, recreational or sporting activities: Quite a bit of difficulty  Getting into or out of the bath: No difficulty  Walking between rooms: No difficulty  Putting on your shoes or socks: No difficulty  Squatting: Moderate difficulty  Lifting an object, like a bag of groceries from the floor: No difficulty  Performing light activities around your home: No difficulty  Performing heavy activities around your home: Moderate difficulty  Getting into or out of a car: No difficulty  Walking 2 blocks: A little bit of difficulty  Walking a mile: A little bit of difficulty  Going up or down 10 stairs (about 1 flight of stairs): No difficulty  Standing for 1 hour: No difficulty  Sitting for 1 hour: No difficulty  Running on even ground: Extreme difficulty or unable to perform activity  Running on uneven ground: Extreme difficulty or unable to perform activity  Making sharp turns while running fast: Extreme difficulty or unable to perform activity  Hopping: Extreme difficulty or unable to perform activity  Rolling over in bed: No difficulty  Total: 55      Time Calculation:     Start Time: 0815     Therapy Charges for Today     Code Description Service Date Service Provider Modifiers Qty    66531193237 HC PT EVAL LOW COMPLEXITY 4 7/3/2019 Perkins, Corinne E, PT GP 1          PT G-Codes  Outcome Measure Options: Lower Extremity Functional Scale (LEFS)  Total: 55         Corinne E. Perkins, GEOVANY  7/3/2019

## 2019-07-09 ENCOUNTER — HOSPITAL ENCOUNTER (OUTPATIENT)
Dept: PHYSICAL THERAPY | Facility: HOSPITAL | Age: 59
Setting detail: THERAPIES SERIES
Discharge: HOME | End: 2019-07-09

## 2019-07-09 DIAGNOSIS — M72.2 PLANTAR FASCIAL FIBROMATOSIS OF LEFT FOOT: Primary | ICD-10-CM

## 2019-07-09 PROCEDURE — 97110 THERAPEUTIC EXERCISES: CPT | Performed by: PHYSICAL THERAPIST

## 2019-07-09 NOTE — THERAPY TREATMENT NOTE
Outpatient Physical Therapy Ortho Treatment Note  [facility]     Patient Name: Rehana Navas  : 1960  MRN: [id number]  Today's Date: 2019      Visit Date: 2019    Visit Dx:    ICD-10-CM ICD-9-CM   1. Plantar fascial fibromatosis of left foot M72.2 728.71       There is no problem list on file for this patient.       Past Medical History:   Diagnosis Date   • Allergic    • Asthma         Past Surgical History:   Procedure Laterality Date   • BUNIONECTOMY     • HYSTERECTOMY     • TONSILLECTOMY                         PT Assessment/Plan     Row Name 19 08          PT Assessment    Assessment Comments  Pt tolerated initial ther exercises in clinic without increased symptoms. Verbalized improved pain after stretching DF, eversion, and PF. Minimal fatigue noted after theraband ankle 4 way. Trial of ionto used today to decrease pain.   -CP        PT Plan    PT Plan Comments  Assess response from ther exercises in clinic and response from ionto. Give written HEP next visit with RTB.   -CP       User Key  (r) = Recorded By, (t) = Taken By, (c) = Cosigned By    Initials Name Provider Type    CP Perkins, Corinne E, PT Physical Therapist          Modalities     Row Name 19             Iontophoresis 00168    MA/Min  80  -CP      Dexamethasone used  Yes  -CP      Patch Type  -- Activapatch  -CP      66649 - PT Iontophoresis Minutes  3  -CP        User Key  (r) = Recorded By, (t) = Taken By, (c) = Cosigned By    Initials Name Provider Type    CP Perkins, Corinne E, PT Physical Therapist        Exercises     Row Name 19 08             Subjective Comments    Subjective Comments  Pt states she has been wearing her flip flops more, keeping up with her stretches.   -CP         Subjective Pain    Able to rate subjective pain?  yes  -CP      Pre-Treatment Pain Level  3  -CP      Post-Treatment Pain Level  2  -CP         Total Minutes    30993 - PT Therapeutic Exercise Minutes  36  -CP          Exercise 1    Exercise Name 1  LE bike Level 1  -CP      Cueing 1  Verbal  -CP      Time 1  4 minutes  -CP         Exercise 2    Exercise Name 2  gastroc stretch, soleus stretch  -CP      Cueing 2  Verbal  -CP      Reps 2  3x each  -CP      Time 2  15 sec  -CP         Exercise 3    Exercise Name 3  great toe stretch, eversion stretch  -CP      Cueing 3  Verbal  -CP      Reps 3  2x  -CP      Time 3  10 sec hold  -CP         Exercise 4    Exercise Name 4  TG heel raises, toe raises  -CP      Cueing 4  Verbal  -CP      Reps 4  10x each  -CP         Exercise 5    Exercise Name 5  TG squats  -CP      Cueing 5  Verbal;Demo  -CP      Reps 5  12x  -CP      Additional Comments  reports increased right knee pain  -CP         Exercise 6    Exercise Name 6  ankle 4 way with RTB  -CP      Cueing 6  Verbal  -CP      Reps 6  10x each  -CP      Additional Comments  RTB  -CP         Exercise 7    Exercise Name 7  Prostretch rocker DF/PF  -CP      Cueing 7  Verbal  -CP      Reps 7  10x each  -CP         Exercise 8    Exercise Name 8  foot intrinsics (towel scrunch, toe apart)  -CP      Cueing 8  Verbal  -CP      Reps 8  10x  -CP        User Key  (r) = Recorded By, (t) = Taken By, (c) = Cosigned By    Initials Name Provider Type    CP Perkins, Corinne E, PT Physical Therapist                                          Time Calculation:   Start Time: 0819  Total Timed Code Minutes- PT: 39 minute(s)  Therapy Charges for Today     Code Description Service Date Service Provider Modifiers Qty    82998572425 HC PT THER PROC EA 15 MIN 7/9/2019 Perkins, Corinne E, PT GP 3                    Corinne E. Perkins, PT  7/9/2019

## 2019-07-11 ENCOUNTER — HOSPITAL ENCOUNTER (OUTPATIENT)
Dept: PHYSICAL THERAPY | Facility: HOSPITAL | Age: 59
Setting detail: THERAPIES SERIES
Discharge: HOME | End: 2019-07-11

## 2019-07-11 DIAGNOSIS — M72.2 PLANTAR FASCIAL FIBROMATOSIS OF LEFT FOOT: Primary | ICD-10-CM

## 2019-07-11 PROCEDURE — 97110 THERAPEUTIC EXERCISES: CPT | Performed by: PHYSICAL THERAPIST

## 2019-07-11 NOTE — THERAPY TREATMENT NOTE
"    Outpatient Physical Therapy Ortho Treatment Note  [facility]     Patient Name: Rehana Navas  : 1960  MRN: [id number]  Today's Date: 2019      Visit Date: 2019    Visit Dx:    ICD-10-CM ICD-9-CM   1. Plantar fascial fibromatosis of left foot M72.2 728.71       There is no problem list on file for this patient.       Past Medical History:   Diagnosis Date   • Allergic    • Asthma         Past Surgical History:   Procedure Laterality Date   • BUNIONECTOMY     • HYSTERECTOMY     • TONSILLECTOMY                         PT Assessment/Plan     Row Name 19 0800          PT Assessment    Assessment Comments  Pt educated on HEP, given written copy for home after demonstrating understanding in clinic. Pain isolated to lateral plantar surface per patient report; however following treatment, pt verbalized minimal medial pain.   -CP        PT Plan    PT Plan Comments  Assess compliance with HEP. Trial US next visit with OP stretches.   -CP       User Key  (r) = Recorded By, (t) = Taken By, (c) = Cosigned By    Initials Name Provider Type    CP Perkins, Corinne E, PT Physical Therapist          Modalities     Row Name 19 0800             Iontophoresis 30653    MA/Min  80  -CP      Dexamethasone used  Yes  -CP      Patch Type  -- Activapatch  -CP      69389 - PT Iontophoresis Minutes  3  -CP        User Key  (r) = Recorded By, (t) = Taken By, (c) = Cosigned By    Initials Name Provider Type    CP Perkins, Corinne E, PT Physical Therapist        Exercises     Row Name 19 0800             Subjective Comments    Subjective Comments  Pt states pain is worse in the mornings, rated a 6/10 until she \"warmed it up and moved\" improved to 3/10 pain.   -CP         Subjective Pain    Able to rate subjective pain?  yes  -CP      Pre-Treatment Pain Level  3  -CP      Post-Treatment Pain Level  4  -CP         Total Minutes    60542 - PT Therapeutic Exercise Minutes  35  -CP         Exercise 1    " Exercise Name 1  Nustep Level 6  -CP      Time 1  5 minutes  -CP         Exercise 2    Exercise Name 2  gastroc stretch, soleus stretch  -CP      Reps 2  3x each  -CP      Time 2  15 sec  -CP         Exercise 3    Exercise Name 3  great toe stretch, eversion stretch  -CP      Reps 3  2x  -CP      Time 3  15 sec holds  -CP         Exercise 4    Exercise Name 4  TG heel raises, toe raises  -CP      Reps 4  12x each  -CP         Exercise 5    Exercise Name 5  TG squats  -CP      Time 5  1 minute  -CP         Exercise 6    Exercise Name 6  ankle 4 way with RTB  -CP      Reps 6  12x each  -CP      Additional Comments  RTB  -CP         Exercise 7    Exercise Name 7  Prostretch rocker DF/PF  -CP      Reps 7  10x each  -CP         Exercise 8    Exercise Name 8  foot intrinsics (towel scrunch, toe apart)  -CP      Reps 8  15x each  -CP      Additional Comments  denies increase pain  -CP        User Key  (r) = Recorded By, (t) = Taken By, (c) = Cosigned By    Initials Name Provider Type    CP Perkins, Corinne E, PT Physical Therapist                           Therapy Education  Education Details: HEP: see written chart, given RTB for home.   Given: HEP, Symptoms/condition management, Pain management  Program: Reinforced  How Provided: Verbal, Demonstration, Written  Provided to: Patient  Level of Understanding: Verbalized, Demonstrated              Time Calculation:   Start Time: 0800  Total Timed Code Minutes- PT: 38 minute(s)  Therapy Charges for Today     Code Description Service Date Service Provider Modifiers Qty    68634767261 HC PT THER PROC EA 15 MIN 7/11/2019 Perkins, Corinne E, PT GP 3                    Corinne E. Perkins, PT  7/11/2019

## 2019-07-16 ENCOUNTER — HOSPITAL ENCOUNTER (OUTPATIENT)
Dept: PHYSICAL THERAPY | Facility: HOSPITAL | Age: 59
Setting detail: THERAPIES SERIES
Discharge: HOME | End: 2019-07-16

## 2019-07-16 DIAGNOSIS — M72.2 PLANTAR FASCIAL FIBROMATOSIS OF LEFT FOOT: Primary | ICD-10-CM

## 2019-07-16 PROCEDURE — 97110 THERAPEUTIC EXERCISES: CPT | Performed by: PHYSICAL THERAPIST

## 2019-07-16 NOTE — THERAPY TREATMENT NOTE
Outpatient Physical Therapy Ortho Treatment Note  [facility]     Patient Name: Rehana Navas  : 1960  MRN: [id number]  Today's Date: 2019      Visit Date: 2019    Visit Dx:    ICD-10-CM ICD-9-CM   1. Plantar fascial fibromatosis of left foot M72.2 728.71       There is no problem list on file for this patient.       Past Medical History:   Diagnosis Date   • Allergic    • Asthma         Past Surgical History:   Procedure Laterality Date   • BUNIONECTOMY     • HYSTERECTOMY     • TONSILLECTOMY                         PT Assessment/Plan     Row Name 19 0800          PT Assessment    Assessment Comments  Pt demonstrated improved DF AAROM and PROM without pain. Pt verbalized improved pain mgmt overall with increased activity tolerance.   -CP        PT Plan    PT Plan Comments  Assess compliance with progressed HEP. Could trial US next visit, pending symptoms. Assess response from initial walking program (land based and aquatics).   -CP       User Key  (r) = Recorded By, (t) = Taken By, (c) = Cosigned By    Initials Name Provider Type    CP Perkins, Corinne E, PT Physical Therapist            Exercises     Row Name 19 0800             Subjective Comments    Subjective Comments  Pt states she has been doing more activity, denies increase in pain.   -CP         Subjective Pain    Able to rate subjective pain?  yes  -CP      Pre-Treatment Pain Level  2  -CP      Post-Treatment Pain Level  2  -CP         Total Minutes    57945 - PT Therapeutic Exercise Minutes  38  -CP         Exercise 1    Exercise Name 1  Nustep Level 6  -CP      Time 1  5 minutes  -CP         Exercise 2    Exercise Name 2  gastroc stretch, soleus stretch  -CP      Reps 2  3x each  -CP      Time 2  15 sec each  -CP         Exercise 3    Exercise Name 3  standing heel raises, toe raises  -CP      Cueing 3  Verbal  -CP      Reps 3  15x each  -CP         Exercise 4    Exercise Name 4  TG heel raises, toe raises  -CP       Reps 4  15x each  -CP         Exercise 5    Exercise Name 5  TG squats  -CP      Time 5  2 minute  -CP         Exercise 6    Exercise Name 6  ankle 4 way with GTB  -CP      Reps 6  15x each  -CP         Exercise 7    Exercise Name 7  side stepping, without theraband  -CP      Reps 7  2 x 30 ft each  -CP         Exercise 8    Exercise Name 8  wobble board DF/pF, inv/benjie  -CP      Cueing 8  Verbal;Demo  -CP      Reps 8  10x each direction, then midline holds 30 sec each  -CP         Exercise 9    Exercise Name 9  Attempted standing squats  -CP      Cueing 9  Verbal;Demo  -CP      Reps 9  5x  -CP      Additional Comments  limited by right knee pain, not left ankle/heel pain  -CP         Exercise 10    Exercise Name 10  Gait training: vc's for increased stride length and arm swing; improved with cues and practice, mild increase in L plantar pain  -CP      Cueing 10  Verbal;Demo  -CP      Reps 10  5 x 150 ft   -CP        User Key  (r) = Recorded By, (t) = Taken By, (c) = Cosigned By    Initials Name Provider Type    CP Perkins, Corinne E, PT Physical Therapist                           Therapy Education  Education Details: Progressed HEP to include GTB for 4 way ankle, standing heel raises, and standing toe raises.   Given: HEP, Symptoms/condition management  Program: Progressed  How Provided: Verbal, Demonstration  Provided to: Patient  Level of Understanding: Verbalized, Demonstrated              Time Calculation:   Start Time: 0845  Total Timed Code Minutes- PT: 38 minute(s)  Therapy Charges for Today     Code Description Service Date Service Provider Modifiers Qty    39831080554 HC PT THER PROC EA 15 MIN 7/16/2019 Perkins, Corinne E, PT GP 3                    Corinne E. Perkins, PT  7/16/2019

## 2019-07-18 ENCOUNTER — HOSPITAL ENCOUNTER (OUTPATIENT)
Dept: PHYSICAL THERAPY | Facility: HOSPITAL | Age: 59
Setting detail: THERAPIES SERIES
Discharge: HOME | End: 2019-07-18

## 2019-07-18 DIAGNOSIS — M72.2 PLANTAR FASCIAL FIBROMATOSIS OF LEFT FOOT: Primary | ICD-10-CM

## 2019-07-18 PROCEDURE — 97110 THERAPEUTIC EXERCISES: CPT | Performed by: PHYSICAL THERAPIST

## 2019-07-18 PROCEDURE — 97035 APP MDLTY 1+ULTRASOUND EA 15: CPT | Performed by: PHYSICAL THERAPIST

## 2019-07-18 NOTE — THERAPY TREATMENT NOTE
Outpatient Physical Therapy Ortho Treatment Note  [facility]     Patient Name: Rehana Navas  : 1960  MRN: [id number]  Today's Date: 2019      Visit Date: 2019    Visit Dx:    ICD-10-CM ICD-9-CM   1. Plantar fascial fibromatosis of left foot M72.2 728.71       There is no problem list on file for this patient.       Past Medical History:   Diagnosis Date   • Allergic    • Asthma         Past Surgical History:   Procedure Laterality Date   • BUNIONECTOMY     • HYSTERECTOMY     • TONSILLECTOMY                         PT Assessment/Plan     Row Name 19 0800          PT Assessment    Assessment Comments  Pt denies new symptoms, tolerated increased time with standing ther exercises without increase pain.   -CP        PT Plan    PT Plan Comments  Assess response from US. Progress next visit as tolerated.   -CP       User Key  (r) = Recorded By, (t) = Taken By, (c) = Cosigned By    Initials Name Provider Type    CP Perkins, Corinne E, PT Physical Therapist          Modalities     Row Name 19 0800             Ultrasound 26986    Location  left plantar fascia, L achilles tendon pt prone  -CP      Duty Cycle  100  -CP      Frequency  1.0 MHz  -CP      Intensity - Wts/cm  1.2  -CP      Phonophoresis  No  -CP      82200 - PT Ultrasound Minutes  8  -CP        User Key  (r) = Recorded By, (t) = Taken By, (c) = Cosigned By    Initials Name Provider Type    CP Cárdenas Corinne E, PT Physical Therapist        Exercises     Row Name 19 0800             Subjective Comments    Subjective Comments  Pt states foot pain is about the same, denies new symptoms.   -CP         Subjective Pain    Able to rate subjective pain?  yes  -CP      Pre-Treatment Pain Level  1  -CP      Post-Treatment Pain Level  1  -CP         Total Minutes    09899 - PT Therapeutic Exercise Minutes  31  -CP         Exercise 1    Exercise Name 1  Nustep Level 6  -CP      Time 1  5 minutes  -CP         Exercise 2    Exercise  Name 2  gastroc stretch, soleus stretch  -CP      Reps 2  3x each  -CP      Time 2  15 sec each  -CP         Exercise 3    Exercise Name 3  eccentric standing heel raise off stepper  -CP      Reps 3  10x each  -CP         Exercise 4    Exercise Name 4  C/CC standing circles  -CP      Reps 4  10x each direction  -CP      Additional Comments  in parallel bars for BUE support  -CP         Exercise 5    Exercise Name 5  TG squats  -CP      Time 5  2 minute  -CP         Exercise 6    Exercise Name 6  Reviewed current HEP and progression for home.   -CP         Exercise 7    Exercise Name 7  side stepping, without theraband  -CP      Reps 7  2 x 30 ft each  -CP         Exercise 8    Exercise Name 8  wobble board DF/pF, inv/benjie  -CP      Reps 8  10x each direction, then midline holds 30 sec each  -CP      Additional Comments  standing   -CP        User Key  (r) = Recorded By, (t) = Taken By, (c) = Cosigned By    Initials Name Provider Type    CP Perkins, Corinne E, PT Physical Therapist                                          Time Calculation:   Start Time: 0845  Total Timed Code Minutes- PT: 39 minute(s)  Therapy Charges for Today     Code Description Service Date Service Provider Modifiers Qty    44997583835  PT THER PROC EA 15 MIN 7/18/2019 Perkins, Corinne E, PT GP 2    80868789168 HC PT ULTRASOUND EA 15 MIN 7/18/2019 Perkins, Corinne E, PT GP 1                    Corinne E. Perkins, PT  7/18/2019

## 2019-07-23 ENCOUNTER — HOSPITAL ENCOUNTER (OUTPATIENT)
Dept: PHYSICAL THERAPY | Facility: HOSPITAL | Age: 59
Setting detail: THERAPIES SERIES
Discharge: HOME | End: 2019-07-23

## 2019-07-23 DIAGNOSIS — M72.2 PLANTAR FASCIAL FIBROMATOSIS OF LEFT FOOT: Primary | ICD-10-CM

## 2019-07-23 PROCEDURE — 97112 NEUROMUSCULAR REEDUCATION: CPT | Performed by: PHYSICAL THERAPIST

## 2019-07-23 PROCEDURE — 97110 THERAPEUTIC EXERCISES: CPT | Performed by: PHYSICAL THERAPIST

## 2019-07-23 NOTE — THERAPY TREATMENT NOTE
Outpatient Physical Therapy Ortho Treatment Note  [facility]     Patient Name: Rehana Navas  : 1960  MRN: [id number]  Today's Date: 2019      Visit Date: 2019    Visit Dx:    ICD-10-CM ICD-9-CM   1. Plantar fascial fibromatosis of left foot M72.2 728.71       There is no problem list on file for this patient.       Past Medical History:   Diagnosis Date   • Allergic    • Asthma         Past Surgical History:   Procedure Laterality Date   • BUNIONECTOMY     • HYSTERECTOMY     • TONSILLECTOMY                         PT Assessment/Plan     Row Name 19 0800          PT Assessment    Assessment Comments  No new symptoms, pt verbalized improved pain  mgmt overall. She is compliant with current HEP, tolerated progression of standign CKC ther exercises and NMRE. Intermittent UE support required for balance.   -CP        PT Plan    PT Plan Comments  Progress written HEP as able next visit. Could give BTB next visit as well.   -CP       User Key  (r) = Recorded By, (t) = Taken By, (c) = Cosigned By    Initials Name Provider Type    CP Perkins, Corinne E, PT Physical Therapist            Exercises     Row Name 19 0800             Subjective Comments    Subjective Comments  Pt reports her foot pain is more mild when present, not shooting or severe. She states she has noticed increased right knee pain after dancing this weekend, states she gets her orthotics tomorrow.   -CP         Subjective Pain    Able to rate subjective pain?  yes  -CP      Pre-Treatment Pain Level  1  -CP      Post-Treatment Pain Level  1  -CP         Total Minutes    00456 - PT Therapeutic Exercise Minutes  28  -CP      85410 -  PT Neuromuscular Reeducation Minutes  11  -CP         Exercise 1    Exercise Name 1  Nustep Level 7  -CP      Time 1  5 minutes  -CP         Exercise 2    Exercise Name 2  gastroc stretch, soleus stretch  -CP      Reps 2  2x each  -CP      Time 2  15 sec each  -CP         Exercise 3    Exercise  Name 3  eccentric standing heel raise off stepper  -CP      Reps 3  15x  -CP         Exercise 4    Exercise Name 4  C/CC standing circles  -CP      Reps 4  10x each direction  -CP         Exercise 5    Exercise Name 5  TG squats  -CP      Time 5  2 minutes  -CP         Exercise 6    Exercise Name 6  TG toe raises, heel raisees  -CP      Reps 6  15x each  -CP         Exercise 7    Exercise Name 7  side stepping, zig zag stepping with BT above knees  -CP      Reps 7  2 x 30 ft each  -CP      Additional Comments  BTB  -CP         Exercise 8    Exercise Name 8  NMRE: narrow RINA on level surface EO/EC each narrow RINA on airex EO/EC each, mod tandem stance EO/EC on level and unlevel surface, tandem stance on level surface only EO, SLS EO  -CP      Sets 8  2x  -CP      Reps 8  30 sec each  -CP      Additional Comments  intermittent UE support as needed for balance  -CP         Exercise 9    Exercise Name 9  standing gastroc and soleus stretch  -CP      Reps 9  2x each  -CP      Additional Comments  using stepper for stretch  -CP        User Key  (r) = Recorded By, (t) = Taken By, (c) = Cosigned By    Initials Name Provider Type    CP Perkins, Corinne E, PT Physical Therapist                                          Time Calculation:   Start Time: 0845  Total Timed Code Minutes- PT: 39 minute(s)  Therapy Charges for Today     Code Description Service Date Service Provider Modifiers Qty    88377477569 HC PT NEUROMUSC RE EDUCATION EA 15 MIN 7/23/2019 Perkins, Corinne E, PT GP 1    31648774009 HC PT THER PROC EA 15 MIN 7/23/2019 Perkins, Corinne E, PT GP 2                    Corinne E. Perkins, PT  7/23/2019

## 2019-07-25 ENCOUNTER — HOSPITAL ENCOUNTER (OUTPATIENT)
Dept: PHYSICAL THERAPY | Facility: HOSPITAL | Age: 59
Setting detail: THERAPIES SERIES
Discharge: HOME | End: 2019-07-25

## 2019-07-25 DIAGNOSIS — M72.2 PLANTAR FASCIAL FIBROMATOSIS OF LEFT FOOT: Primary | ICD-10-CM

## 2019-07-25 PROCEDURE — 97110 THERAPEUTIC EXERCISES: CPT | Performed by: PHYSICAL THERAPIST

## 2019-07-25 PROCEDURE — 97112 NEUROMUSCULAR REEDUCATION: CPT | Performed by: PHYSICAL THERAPIST

## 2019-07-25 NOTE — THERAPY TREATMENT NOTE
"    Outpatient Physical Therapy Ortho Treatment Note  [facility]     Patient Name: Rehana Navas  : 1960  MRN: [id number]  Today's Date: 2019      Visit Date: 2019    Visit Dx:    ICD-10-CM ICD-9-CM   1. Plantar fascial fibromatosis of left foot M72.2 728.71       There is no problem list on file for this patient.       Past Medical History:   Diagnosis Date   • Allergic    • Asthma         Past Surgical History:   Procedure Laterality Date   • BUNIONECTOMY     • HYSTERECTOMY     • TONSILLECTOMY                         PT Assessment/Plan     Row Name 19 0900          PT Assessment    Assessment Comments  Pt tolerated increased resistance, gave BTB for home use. She denies increase ankle/foot pain, is progressing as expected with improved mobility functionally.   -CP        PT Plan    PT Plan Comments  Reassessment next visit.   -CP       User Key  (r) = Recorded By, (t) = Taken By, (c) = Cosigned By    Initials Name Provider Type    CP Perkins, Corinne E, PT Physical Therapist            Exercises     Row Name 19 0900             Subjective Comments    Subjective Comments  Pt states left foot pain is really mild on arrival, rates pain less than 1/10. She states she was able to fully participate in water aerobics last night without increased foot pain. She reports she picked up orthotics yesterday but hasn't \"cut them down to fit in shoes\" yet.   -CP         Subjective Pain    Able to rate subjective pain?  yes  -CP      Pre-Treatment Pain Level  0  -CP      Post-Treatment Pain Level  0  -CP         Total Minutes    51797 - PT Therapeutic Exercise Minutes  19  -CP      40203 -  PT Neuromuscular Reeducation Minutes  11  -CP         Exercise 1    Exercise Name 1  Nustep Level 7  -CP      Time 1  5 minutes  -CP         Exercise 2    Exercise Name 2  gastroc stretch, soleus stretch  -CP      Reps 2  2x each  -CP      Time 2  15 sec each  -CP         Exercise 3    Exercise Name 3  " eccentric standing heel raise off stepper  -CP      Reps 3  15x  -CP         Exercise 4    Exercise Name 4  BOSU forward lunges  -CP      Reps 4  10x each  -CP      Additional Comments   in parallel bars for UE support  -CP         Exercise 5    Exercise Name 5  Mini squats on trampoline  -CP      Reps 5  10x  -CP      Additional Comments  mild increase R knee pain  -CP         Exercise 6    Exercise Name 6  BOSU DF/PF, midline holds  -CP      Reps 6  10x each direction; 2x midline holds  -CP      Time 6  20 sec  -CP         Exercise 7    Exercise Name 7  side stepping, zig zag stepping with BT above knees  -CP      Reps 7  2 x 30 ft each  -CP      Additional Comments  BTB  -CP         Exercise 8    Exercise Name 8  NMRE on trampoline: narrow RINA EO/EC, mod tandem EO/EC, tandem EO  -CP      Sets 8  2x each  -CP      Reps 8  30 sec each  -CP         Exercise 9    Exercise Name 9  standing gastroc and soleus stretch  -CP      Reps 9  2x each  -CP        User Key  (r) = Recorded By, (t) = Taken By, (c) = Cosigned By    Initials Name Provider Type    CP Perkins, Corinne E, PT Physical Therapist                                          Time Calculation:   Start Time: 0900  Total Timed Code Minutes- PT: 30 minute(s)  Therapy Charges for Today     Code Description Service Date Service Provider Modifiers Qty    00867614533 HC PT NEUROMUSC RE EDUCATION EA 15 MIN 7/25/2019 Perkins, Corinne E, PT GP 1    66327495526 HC PT THER PROC EA 15 MIN 7/25/2019 Perkins, Corinne E, PT GP 1                    Corinne E. Perkins, PT  7/25/2019

## 2019-08-01 ENCOUNTER — HOSPITAL ENCOUNTER (OUTPATIENT)
Dept: PHYSICAL THERAPY | Facility: HOSPITAL | Age: 59
Setting detail: THERAPIES SERIES
Discharge: HOME | End: 2019-08-01

## 2019-08-01 DIAGNOSIS — M72.2 PLANTAR FASCIAL FIBROMATOSIS OF LEFT FOOT: Primary | ICD-10-CM

## 2019-08-01 PROCEDURE — 97110 THERAPEUTIC EXERCISES: CPT | Performed by: PHYSICAL THERAPIST

## 2019-08-01 NOTE — THERAPY PROGRESS REPORT/RE-CERT
Outpatient Physical Therapy Ortho Progress Note  [facility]     Patient Name: Rehana Navas  : 1960  MRN: [id number]  Today's Date: 2019      Visit Date: 2019    Visit Dx:    ICD-10-CM ICD-9-CM   1. Plantar fascial fibromatosis of left foot M72.2 728.71       There is no problem list on file for this patient.       Past Medical History:   Diagnosis Date   • Allergic    • Asthma         Past Surgical History:   Procedure Laterality Date   • BUNIONECTOMY     • HYSTERECTOMY     • TONSILLECTOMY         PT Ortho     Row Name 19 0800       Subjective Pain    Pre-Treatment Pain Level  1  -CP    Post-Treatment Pain Level  1  -CP       Foot/Ankle Palpation    Plantar Fascia  -- denies tenderness today  -CP    Peroneals  -- denies tenderness today to palpation  -CP       Ankle/Foot Special Tests    Chaudhry test (Achilles’ tendon rupture)  Negative  -CP    Inversion Stress Test  Negative  -CP    Eversion Stress Test  Negative  -CP       Right Lower Ext    Rt Ankle Dorsiflexion AROM  11  -CP    Rt Ankle Dorsiflexion PROM  18  -CP    Rt Ankle Plantarflexion AROM  54  -CP    Rt Ankle Plantarflexion PROM  60  -CP    Rt Ankle Inversion AROM  WFL  -CP    Rt Ankle Eversion AROM  WFL  -CP    RT Lower Extremity Comments  Right ankle WFL  -CP       Left Lower Ext    Lt Ankle Dorsiflexion AROM  12  -CP    Lt Ankle Dorsiflexion PROM  16  -CP    Lt Ankle Plantarflexion AROM  56  -CP    Lt Ankle Plantarflexion PROM  60  -CP    Lt Ankle Inversion AROM  WFL 30  -CP    Lt Ankle Inversion PROM  WFL  -CP    Lt Ankle Eversion AROM  WFL 24  -CP    Lt Ankle Eversion PROM  WFL  -CP    LT Lower Extremity Comments  denies pain with AROM  -CP       Sensation    Light Touch  No apparent deficits  -CP      User Key  (r) = Recorded By, (t) = Taken By, (c) = Cosigned By    Initials Name Provider Type    CP Perkins, Corinne E, PT Physical Therapist                      PT Assessment/Plan     Row Name 19 0900          PT  Assessment    Functional Limitations  Impaired gait;Limitation in home management;Limitations in community activities;Performance in leisure activities  -CP     Impairments  Gait;Balance;Range of motion;Pain;Muscle strength;Joint mobility  -CP     Assessment Comments  Pt is progressing towards her functional goals as expected with improved left ankle AROM, improved gait mechanics on level surface, and verbalized decrease overall L ankle/heel pain. She has been limited recently d/t ongoing right knee pain (following work injury 2016), reports increased right knee swelling and pain. Pt encouraged to f/u with her ortho MD for right knee, possible repeat injection or MRI, d/t reports of mechanical symptoms and pain limiting mobility.   -CP     Please refer to paper survey for additional self-reported information  Yes  -CP     Rehab Potential  Good  -CP     Patient/caregiver participated in establishment of treatment plan and goals  Yes  -CP     Patient would benefit from skilled therapy intervention  Yes  -CP        PT Plan    PT Frequency  1x/week  -CP     Predicted Duration of Therapy Intervention (Therapy Eval)  4 visits  -CP     Planned CPT's?  PT RE-EVAL: 65965;PT THER PROC EA 15 MIN: 75978;PT MANUAL THERAPY EA 15 MIN: 02126;PT NEUROMUSC RE-EDUCATION EA 15 MIN: 58491;PT GAIT TRAINING EA 15 MIN: 46591;PT ELECTRICAL STIM UNATTEND: ;PT ULTRASOUND EA 15 MIN: 00267;PT HOT/COLD PACK WC NONMCARE: 65430;PT IONTOPHORESIS EA 15 MIN: 76849  -CP     PT Plan Comments  Recommend patient continue PT for further improvement in left DF AROM, LE strengthening, and NMRE to decrease fall risk prior to patient returning to work. F/u with patient regarding right knee pain/symptoms next visit.   -CP       User Key  (r) = Recorded By, (t) = Taken By, (c) = Cosigned By    Initials Name Provider Type    CP Perkins, Corinne E, PT Physical Therapist            Exercises     Row Name 08/01/19 0800             Subjective Comments     Subjective Comments  Pt reports her right knee pain has been elevated recently, noted increased lateral joint line swelling and pain with walking. She reports she will f/u with [facility] ortho MD for another injection, possible f/u with PCP for MRI of R knee. She states her left foot pain has significantly improved; however limited with activity participation in aquatics and walking in past few days d/t knee pain.   -CP         Subjective Pain    Able to rate subjective pain?  yes  -CP      Pre-Treatment Pain Level  1  -CP      Post-Treatment Pain Level  1  -CP         Total Minutes    91514 - PT Therapeutic Exercise Minutes  38  -CP         Exercise 1    Exercise Name 1  Reassessment completed this date in clinic.   -CP         Exercise 2    Exercise Name 2  gastroc stretch, soleus stretch  -CP      Reps 2  2x each  -CP      Time 2  15 sec each  -CP         Exercise 3    Exercise Name 3  Reviewed current HEP and progression for home. Discussed options for right knee, encouraged patient to include heel slides for AAROM and quad sets.   -CP        User Key  (r) = Recorded By, (t) = Taken By, (c) = Cosigned By    Initials Name Provider Type    CP Perkins, Corinne E, PT Physical Therapist                       PT OP Goals     Row Name 08/01/19 0900          PT Short Term Goals    STG Date to Achieve  07/17/19  -CP     STG 1  Patient reports foot/ankle pain is reduced by at least 25%.  -CP     STG 1 Progress  Met  -CP     STG 2  Patient is independent with HEP for flexibility and strengthening.  -CP     STG 2 Progress  Met  -CP     STG 3  Pt will be educated on custom orthotic and night splint option for improved medial arch support ambulating and stretch at night.   -CP     STG 3 Progress  Met  -CP        Long Term Goals    LTG Date to Achieve  08/31/19  -CP     LTG 1  LEFS score is improved by at least 10 points to demonstrated improved functional mobility.  -CP     LTG 1 Progress  Ongoing;Progressing  -CP     LTG 2  Patient  will demonstrate independent HEP progressed for closed chain strength, proprioception, balance and agility with minimal or no compensations or exacerbations  -CP     LTG 2 Progress  Ongoing  -CP     LTG 3  Pt will demonstrate functional left ankle AROM without increased pain for improved mobility.   -CP     LTG 3 Progress  Partially Met  -CP        Time Calculation    PT Goal Re-Cert Due Date  10/01/19  -CP       User Key  (r) = Recorded By, (t) = Taken By, (c) = Cosigned By    Initials Name Provider Type    CP Perkins, Corinne E, PT Physical Therapist               Outcome Measure Options: Lower Extremity Functional Scale (LEFS)  Lower Extremity Functional Index  Any of your usual work, housework or school activities: No difficulty  Your usual hobbies, recreational or sporting activities: A little bit of difficulty  Getting into or out of the bath: No difficulty  Walking between rooms: No difficulty  Putting on your shoes or socks: No difficulty  Squatting: A little bit of difficulty  Lifting an object, like a bag of groceries from the floor: No difficulty  Performing light activities around your home: No difficulty  Performing heavy activities around your home: No difficulty  Getting into or out of a car: No difficulty  Walking 2 blocks: A little bit of difficulty  Walking a mile: A little bit of difficulty  Going up or down 10 stairs (about 1 flight of stairs): A little bit of difficulty  Standing for 1 hour: No difficulty  Sitting for 1 hour: No difficulty  Running on even ground: Extreme difficulty or unable to perform activity  Running on uneven ground: Extreme difficulty or unable to perform activity  Making sharp turns while running fast: Extreme difficulty or unable to perform activity  Hopping: Extreme difficulty or unable to perform activity  Rolling over in bed: No difficulty  Total: 59      Time Calculation:   Start Time: 0845  Total Timed Code Minutes- PT: 38 minute(s)  Therapy Charges for Today      Code Description Service Date Service Provider Modifiers Qty    [id number] HC PT THER PROC EA 15 MIN 8/1/2019 Perkins, Corinne E, PT GP 3          PT G-Codes  Outcome Measure Options: Lower Extremity Functional Scale (LEFS)  Total: 59         Corinne E. Perkins, PT  8/1/2019

## 2019-08-06 ENCOUNTER — APPOINTMENT (OUTPATIENT)
Dept: PHYSICAL THERAPY | Facility: HOSPITAL | Age: 59
End: 2019-08-06

## 2019-08-13 ENCOUNTER — HOSPITAL ENCOUNTER (OUTPATIENT)
Dept: PHYSICAL THERAPY | Facility: HOSPITAL | Age: 59
Setting detail: THERAPIES SERIES
Discharge: HOME | End: 2019-08-13

## 2019-08-13 ENCOUNTER — TRANSCRIBE ORDERS (OUTPATIENT)
Dept: PHYSICAL THERAPY | Facility: HOSPITAL | Age: 59
End: 2019-08-13

## 2019-08-13 DIAGNOSIS — M17.11 PRIMARY OSTEOARTHRITIS OF ONE KNEE, RIGHT: Primary | ICD-10-CM

## 2019-08-13 DIAGNOSIS — M17.11 PRIMARY OSTEOARTHRITIS OF RIGHT KNEE: Primary | ICD-10-CM

## 2019-08-13 DIAGNOSIS — M72.2 PLANTAR FASCIAL FIBROMATOSIS OF LEFT FOOT: ICD-10-CM

## 2019-08-13 PROCEDURE — 97110 THERAPEUTIC EXERCISES: CPT | Performed by: PHYSICAL THERAPIST

## 2019-08-13 PROCEDURE — 97164 PT RE-EVAL EST PLAN CARE: CPT | Performed by: PHYSICAL THERAPIST

## 2019-08-13 NOTE — THERAPY RE-EVALUATION
Outpatient Physical Therapy Peds Re-Evaluation   [place]     Patient Name: Rehana Navas  : 1960  MRN: [id number]  Today's Date: 2019       Visit Date: 2019     There is no problem list on file for this patient.    Past Medical History:   Diagnosis Date   • Allergic    • Asthma      Past Surgical History:   Procedure Laterality Date   • BUNIONECTOMY     • HYSTERECTOMY     • TONSILLECTOMY         Visit Dx:    ICD-10-CM ICD-9-CM   1. Primary osteoarthritis of one knee, right M17.11 715.16   2. Plantar fascial fibromatosis of left foot M72.2 728.71           PT Ortho     Row Name 19 0900       Special Tests/Palpation    Special Tests/Palpation  Knee  -CP       Knee Palpation    Knee Palpation?  Yes  -CP    Medial Joint Line  Right:;Tender  -CP    Lateral Joint Line  Right:;Tender  -CP       Patellar Accessory Motions    Patellar Accessory Motions Tested?  Yes  -CP    Superior glide  WNL  -CP    Inferior glide  WNL  -CP    Medial glide  WNL  -CP    Lateral glide  WNL  -CP    Lateral tilt  Right:;Right pain  -CP       Knee Special Tests    Anterior drawer (ACL lesion)  Negative  -CP    Posterior drawer (PCL lesion)  Negative  -CP    Valgus stress (MCL lesion)  Negative  -CP    Varus stress (LCL lesion)  Negative  -CP    Thessaly test (meniscal lesion)  Positive  -CP    Patellar grind test (chondromalacia patella)  Positive  -CP    Patellofemoral apprehension sign (instability)  Negative  -CP       General ROM    RT Lower Ext  Rt Knee Extension/Flexion  -CP    LT Lower Ext  Lt Knee Extension/Flexion  -CP       Right Lower Ext    Rt Knee Extension/Flexion AROM  0-113 lateral joint line pain end range flexion  -CP    Rt Knee Extension/Flexion PROM  0-119  -CP       Left Lower Ext    Lt Knee Extension/Flexion AROM  0-120  -CP       MMT (Manual Muscle Testing)    Rt Lower Ext  Rt Hip Flexion;Rt Hip Extension;Rt Hip ABduction;Rt Hip ADduction;Rt Knee Extension;Rt Knee Flexion  -CP       MMT  Right Lower Ext    Rt Hip Flexion MMT, Gross Movement  (4+/5) good plus  -CP    Rt Hip Extension MMT, Gross Movement  (4/5) good  -CP    Rt Hip ABduction MMT, Gross Movement  (4+/5) good plus  -CP    Rt Hip ADduction MMT, Gross Movement  (4+/5) good plus  -CP    Rt Knee Extension MMT, Gross Movement  (4-/5) good minus  -CP    Rt Knee Flexion MMT, Gross Movement  (3-/5) fair minus  -CP    Rt Lower Extremity Comments   pain with knee extension  -CP       Sensation    Light Touch  No apparent deficits  -CP       Lower Extremity Flexibility    Hamstrings  Left:;WNL;Right:;Mildly limited  -CP    Gastrocnemius  Right:;Left:;Mildly limited  -CP       Gait/Stairs Assessment/Training    Number of Steps (Stairs)  12  -CP    Ascending Technique (Stairs)  step-over-step  -CP    Descending Technique (Stairs)  step-to-step  -CP      User Key  (r) = Recorded By, (t) = Taken By, (c) = Cosigned By    Initials Name Provider Type    CP Perkins, Corinne E, PT Physical Therapist                               Exercises     Row Name 08/13/19 0900             Subjective Comments    Subjective Comments  Patient states she had injection for right knee pain on 08/06/19 at  with mild relief. She presents to clinic with new PT order for right knee pain, diagnosis primary knee OA. 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 Pt states her left ankle pain has significantly improved and would like to focus treatment on right knee.   -CP         Subjective Pain    Able to rate subjective pain?  yes  -CP      Pre-Treatment Pain Level  1  -CP      Post-Treatment Pain Level  1  -CP         Total Minutes    13183 - PT Therapeutic Exercise Minutes  10  -CP         Exercise 1    Exercise Name 1  Re-eval performed for right knee today. New PT order from  [facility].   -CP         Exercise 2    Exercise Name 2  gastroc stretch, soleus stretch  -CP      Reps 2  2x each  -CP      Time 2  15 sec each  -CP         Exercise  "3    Exercise Name 3  stair training  -CP      Cueing 3  Verbal;Demo  -CP      Sets 3  2  -CP      Reps 3  10  -CP      Additional Comments  verbal cues for \"up with good, down with bad\" and how to modifiy for work tasks when children are involved.   -CP         Exercise 4    Exercise Name 4  Reviewed current HEP for left ankle and added knee flexion slides for improved AAROM with decrease pain.   -CP         Exercise 5    Exercise Name 5  Increased time spent on patient education of knee mechanics and ROM.   -CP        User Key  (r) = Recorded By, (t) = Taken By, (c) = Cosigned By    Initials Name Provider Type    CP Perkins, Corinne E, PT Physical Therapist                       PT OP Goals     Row Name 08/13/19 0930          PT Short Term Goals    STG Date to Achieve  08/27/19  -CP     STG 1  Patient reports foot/ankle pain is reduced by at least 25%.  -CP     STG 1 Progress  Met  -CP     STG 2  Patient is independent with HEP for flexibility and strengthening.  -CP     STG 2 Progress  Met  -CP     STG 3  Pt will be educated on custom orthotic and night splint option for improved medial arch support ambulating and stretch at night.   -CP     STG 3 Progress  Met  -CP     STG 4  Patient will negotiate stairs safely with least restrictive device or compensations  -CP     STG 4 Progress  New  -CP        Long Term Goals    LTG Date to Achieve  09/12/19  -CP     LTG 1  LEFS score is improved by at least 10 points to demonstrated improved functional mobility.  -CP     LTG 1 Progress  Ongoing;Progressing  -CP     LTG 2  Patient will demonstrate independent HEP progressed for closed chain strength, proprioception, balance and agility with minimal or no compensations or exacerbations  -CP     LTG 2 Progress  Ongoing  -CP     LTG 3  Pt will demonstrate functional left ankle AROM without increased pain for improved mobility.   -CP     LTG 3 Progress  Partially Met  -CP     LTG 4  Patient will increase active knee flexion to " 120 deg for ease in getting in and out of car and up/down bus stairs.  -CP     LTG 4 Progress  New  -CP     LTG 5  Pt will ascend and descend 8 stairs with reciprocal gait pattern, 1 handrail for support, with pain less than or equal to 1-2/10.   -CP     LTG 5 Progress  New  -CP        Time Calculation    PT Goal Re-Cert Due Date  10/01/19  -CP       User Key  (r) = Recorded By, (t) = Taken By, (c) = Cosigned By    Initials Name Provider Type    CP Perkins, Corinne E, PT Physical Therapist        PT Assessment/Plan     Row Name 08/13/19 4754          PT Assessment    Functional Limitations  Impaired gait;Limitation in home management;Limitations in community activities;Performance in leisure activities  -CP     Impairments  Gait;Balance;Range of motion;Pain;Muscle strength;Joint mobility  -CP     Assessment Comments  Re-evaluation performed today for new PT order from  [facility] for right knee pain. Patient demonstrated decreased right knee AROM, strength, and verbalized mechanical symptoms of buckling intermittently. She denies recent fall, but states she recently started wearing knee brace for improved stability. She verbalized limited activity tolerance d/t medial and lateral joint line pain, associated with primary knee OA. Recommend skilled PT for improved functional mobility of right knee. Pt denies new symptoms for left ankle, verbalized desire to focus on right knee.   -CP     Please refer to paper survey for additional self-reported information  Yes  -CP     Rehab Potential  Good  -CP     Patient/caregiver participated in establishment of treatment plan and goals  Yes  -CP     Patient would benefit from skilled therapy intervention  Yes  -CP        PT Plan    PT Frequency  2x/week  -CP     Predicted Duration of Therapy Intervention (Therapy Eval)  6 visits  -CP     Planned CPT's?  PT RE-EVAL: 05585;PT THER PROC EA 15 MIN: 45236;PT THER ACT EA 15 MIN: 89181;PT MANUAL THERAPY EA 15 MIN: 14993;PT  NEUROMUSC RE-EDUCATION EA 15 MIN: 99700;PT GAIT TRAINING EA 15 MIN: 49423;PT ELECTRICAL STIM UNATTEND: ;PT ULTRASOUND EA 15 MIN: 86071;PT HOT/COLD PACK WC NONMCARE: 75637;PT IONTOPHORESIS EA 15 MIN: 63831  -CP     PT Plan Comments  Recommend skilled PT, including ther exercises, manual, and NMRE as tolerated for improved right knee mobility, decrease pain, and decrease fall risk. Assess patient brace next visit.   -CP       User Key  (r) = Recorded By, (t) = Taken By, (c) = Cosigned By    Initials Name Provider Type    CP Perkins, Corinne E, PT Physical Therapist                 Time Calculation:   Start Time: 0930  Total Timed Code Minutes- PT: 40 minute(s)  Therapy Charges for Today     Code Description Service Date Service Provider Modifiers Qty    41186275325  PT THER PROC EA 15 MIN 8/13/2019 Perkins, Corinne E, PT GP 1    [id number]  PT RE-EVAL ESTABLISHED PLAN 2 8/13/2019 Perkins, Corinne E, PT GP 1                Corinne E. Perkins, PT  8/13/2019

## 2019-08-20 ENCOUNTER — HOSPITAL ENCOUNTER (OUTPATIENT)
Dept: PHYSICAL THERAPY | Facility: HOSPITAL | Age: 59
Setting detail: THERAPIES SERIES
Discharge: HOME | End: 2019-08-20

## 2019-08-20 DIAGNOSIS — M72.2 PLANTAR FASCIAL FIBROMATOSIS OF LEFT FOOT: ICD-10-CM

## 2019-08-20 DIAGNOSIS — M17.11 PRIMARY OSTEOARTHRITIS OF ONE KNEE, RIGHT: Primary | ICD-10-CM

## 2019-08-20 PROCEDURE — 97110 THERAPEUTIC EXERCISES: CPT | Performed by: PHYSICAL THERAPIST

## 2019-08-20 PROCEDURE — 97116 GAIT TRAINING THERAPY: CPT | Performed by: PHYSICAL THERAPIST

## 2019-08-20 NOTE — THERAPY TREATMENT NOTE
Outpatient Physical Therapy Ortho Treatment Note  [facility]     Patient Name: Rehana Navas  : 1960  MRN: [id number]  Today's Date: 2019      Visit Date: 2019    Visit Dx:    ICD-10-CM ICD-9-CM   1. Primary osteoarthritis of one knee, right M17.11 715.16   2. Plantar fascial fibromatosis of left foot M72.2 728.71       There is no problem list on file for this patient.       Past Medical History:   Diagnosis Date   • Allergic    • Asthma         Past Surgical History:   Procedure Laterality Date   • BUNIONECTOMY     • HYSTERECTOMY     • TONSILLECTOMY                         PT Assessment/Plan     Row Name 19 09          PT Assessment    Assessment Comments  Held further progression of strengthening ther exercises for right knee d/t reports of increased mechanical symptoms and lateral joint line pain. Pt verbalized increased fear of falling, required consistent cues to avoid forward flexion posture with standing and walking. Pt ambulated with improved gait mechanics using SPC, fitted correctly and educated on options for home/work as appropriate.   -CP        PT Plan    PT Plan Comments  Assess response from trial of taping. Could trial different technique next visit, pending response.   -CP       User Key  (r) = Recorded By, (t) = Taken By, (c) = Cosigned By    Initials Name Provider Type    CP Perkins, Corinne E, PT Physical Therapist            Exercises     Row Name 19 09             Subjective Comments    Subjective Comments  Pt reports her left foot pain has improved, denies pain. She states she has been working more, noted 1 fall yesterday while on  duty when  hit brakes quickly. She denies hitting her knee but reports increased lateral joint line pain and pain around fibular head yesterday. On arrival, she states pain isn't that bad, just feels unsteady, wearing her brace and has noticed some popping and clicking in knee.   -CP         Subjective Pain  "   Able to rate subjective pain?  yes  -CP      Pre-Treatment Pain Level  1  -CP      Post-Treatment Pain Level  1  -CP         Total Minutes    29136 - Gait Training Minutes   10  -CP      85484 - PT Therapeutic Exercise Minutes  26  -CP      56422 - PT Manual Therapy Minutes  5  -CP         Exercise 1    Exercise Name 1  Nustep Level 7  -CP      Time 1  5 minutes  -CP         Exercise 2    Exercise Name 2  gastroc stretch, soleus stretch  -CP      Reps 2  2x each  -CP      Time 2  15 sec each  -CP         Exercise 3    Exercise Name 3  stair training (forward only)  -CP      Cueing 3  Verbal  -CP      Reps 3  10x each  -CP      Additional Comments  1 riser, 1 handrail for support; limited by knee pain  -CP         Exercise 4    Exercise Name 4  BOSU forward lunge  -CP      Reps 4  10x each  -CP         Exercise 5    Exercise Name 5  TG squats  -CP      Reps 5  15x  -CP         Exercise 6    Exercise Name 6  standing heel raises, toe raises  -CP      Cueing 6  Verbal  -CP      Reps 6  12x each  -CP         Exercise 7    Exercise Name 7  side stepping, zig zag stepping with BT above knees  -CP      Reps 7  held today  -CP         Exercise 8    Exercise Name 8  Gait training with SPC, educated on proper walking technique with SPC in LUE for step through gait pattern. Practiced ambulating with SPC with improved functional knee flexion in swing phase and step length overall. Pt verbalized fear of falling without SPC, states knee will \"give out.\"   -CP      Additional Comments  Pt denies ankle/foot pain with ambulation; limited by knee pain.   -CP         Exercise 9    Exercise Name 9  standing gastroc and soleus stretch  -CP      Reps 9  2x each  -CP        User Key  (r) = Recorded By, (t) = Taken By, (c) = Cosigned By    Initials Name Provider Type    CP Perkins, Corinne E, PT Physical Therapist                      Manual Rx (last 36 hours)      Manual Treatments     Row Name 08/20/19 0900             Total Minutes "    56460 - PT Manual Therapy Minutes  5  -CP         Manual Rx 1    Manual Rx 1 Location  right knee  -CP      Manual Rx 1 Type  patellar taping to assist medial tracking and stabilization; mild rotation at fibular head.   -CP      Manual Rx 1 Duration  5  -CP        User Key  (r) = Recorded By, (t) = Taken By, (c) = Cosigned By    Initials Name Provider Type    CP Perkins, Corinne E, PT Physical Therapist                             Time Calculation:   Start Time: 0925  Total Timed Code Minutes- PT: 41 minute(s)  Therapy Charges for Today     Code Description Service Date Service Provider Modifiers Qty    92199987451  PT THER PROC EA 15 MIN 8/20/2019 Perkins, Corinne E, PT GP 2    [id number] HC GAIT TRAINING EA 15 MIN 8/20/2019 Perkins, Corinne E, PT GP 1                    Corinne E. Perkins, GEOVANY  8/20/2019

## 2019-08-23 ENCOUNTER — APPOINTMENT (OUTPATIENT)
Dept: PHYSICAL THERAPY | Facility: HOSPITAL | Age: 59
End: 2019-08-23

## 2019-09-03 ENCOUNTER — TREATMENT (OUTPATIENT)
Dept: PHYSICAL THERAPY | Facility: CLINIC | Age: 59
End: 2019-09-03

## 2019-09-03 DIAGNOSIS — M17.11 PRIMARY OSTEOARTHRITIS OF ONE KNEE, RIGHT: Primary | ICD-10-CM

## 2019-09-03 DIAGNOSIS — M72.2 PLANTAR FASCIAL FIBROMATOSIS OF LEFT FOOT: ICD-10-CM

## 2019-09-03 PROCEDURE — 97110 THERAPEUTIC EXERCISES: CPT | Performed by: PHYSICAL THERAPIST

## 2019-09-03 PROCEDURE — 97140 MANUAL THERAPY 1/> REGIONS: CPT | Performed by: PHYSICAL THERAPIST

## 2019-09-03 NOTE — PROGRESS NOTES
Physical Therapy Daily Progress Note    Date: 2019    Patient: Rehana Navas  Medical Record #: [id number]  Referring Provider: Gabi Ruiz, *    Visit Diagnosis/ICD-10 Code: Primary osteoarthritis of one knee, right [M17.11]  Patient seen for 11 sessions    Subjective   Patient states her right knee continues to bother her, noted increased instability over the weekend, presents to clinic wearing knee brace. She states she hasn't f/u with PCP yet.   Pain Scale (0-10): 4/10 on arrival, 3/10 post treatment      Objective   See the Objective Evaluation flowsheet for any objective testing that may have been performed today.    Treatment/Procedures/Modalities:   Manual Therapy: 5 Minutes  Therapeutic Exercise: 34 Minutes   Neuromuscular Re-Education: 0 Minutes   Therapeutic Activity: 0 Minutes  Gait Trainin Minutes   Moist Heat:    Ice:    E-Stim:    Ultrasound:    Ionto:   Traction:      Timed Code Treatment: 39 Minutes  Total Treatment Time: 39 Minutes    Assessment/Plan   Patient verbalized improved stability after manual techniques of right patellar taping performed today. Inconsistent lateral patellar pain reported after reciprocal gait on stairs.   Progress strengthening /stabilization /functional activity. Assess response from taping today. Progress CKC quad strengthening as tolerated next visit to include TKE. F/u with patient regarding MD appt.          Corinne E. Perkins, PT  Physical Therapist

## 2019-09-06 ENCOUNTER — TREATMENT (OUTPATIENT)
Dept: PHYSICAL THERAPY | Facility: CLINIC | Age: 59
End: 2019-09-06

## 2019-09-06 DIAGNOSIS — M17.11 PRIMARY OSTEOARTHRITIS OF ONE KNEE, RIGHT: Primary | ICD-10-CM

## 2019-09-06 DIAGNOSIS — M72.2 PLANTAR FASCIAL FIBROMATOSIS OF LEFT FOOT: ICD-10-CM

## 2019-09-06 PROCEDURE — 97110 THERAPEUTIC EXERCISES: CPT | Performed by: PHYSICAL THERAPIST

## 2019-09-06 PROCEDURE — 97530 THERAPEUTIC ACTIVITIES: CPT | Performed by: PHYSICAL THERAPIST

## 2019-09-06 NOTE — PATIENT INSTRUCTIONS
Pt educated on taping technique for home after reporting brace isn't assisting with support. Pt  Also educated on lateral J knee brace option. She is indep with current HEP, reviewed with theraband as needed.

## 2019-09-06 NOTE — PROGRESS NOTES
"Physical Therapy Daily Progress Note    Date: 2019    Patient: Rehana Navas  Medical Record #: [id number]  Referring Provider: Chase Deutsch DPM    Visit Diagnosis/ICD-10 Code: Primary osteoarthritis of one knee, right [M17.11]  Patient seen for 12 sessions    Subjective   Patient states she scheduled a f/u with [facility] MD for . She states her right knee continues to be painful, intermittent locking/buckling, and limit her overall mobility. She states her brace \"won't stay in place and slides too much.\"   Pain Scale (0-10): 3/10      Objective   See the Objective Evaluation flowsheet for any objective testing that may have been performed today.    Treatment/Procedures/Modalities:   Manual Therapy: 9 Minutes  Therapeutic Exercise: 25 Minutes   Neuromuscular Re-Education: 0 Minutes   Therapeutic Activity: 11 Minutes  Gait Trainin Minutes   Moist Heat:    Ice:    E-Stim:    Ultrasound:    Ionto:   Traction:      Timed Code Treatment: 45 Minutes  Total Treatment Time: 45 Minutes    Assessment / Plan:   Assessment/Plan    Progress per Plan of Care. Reassessment next visit after MD appt. Anticipate decrease PT frequency to conserve visits. F/u with patient about possible new knee brace or taping as needed.       Corinne E. Perkins, PT  Physical Therapist        "

## 2019-09-17 ENCOUNTER — OFFICE VISIT (OUTPATIENT)
Dept: RETAIL CLINIC | Facility: CLINIC | Age: 59
End: 2019-09-17

## 2019-09-17 VITALS
HEIGHT: 67 IN | OXYGEN SATURATION: 98 % | HEART RATE: 73 BPM | DIASTOLIC BLOOD PRESSURE: 86 MMHG | RESPIRATION RATE: 18 BRPM | TEMPERATURE: 98.5 F | SYSTOLIC BLOOD PRESSURE: 118 MMHG | BODY MASS INDEX: 39.87 KG/M2 | WEIGHT: 254 LBS

## 2019-09-17 DIAGNOSIS — J06.9 VIRAL UPPER RESPIRATORY TRACT INFECTION: Primary | ICD-10-CM

## 2019-09-17 PROCEDURE — 99213 OFFICE O/P EST LOW 20 MIN: CPT | Performed by: NURSE PRACTITIONER

## 2019-09-17 RX ORDER — BROMPHENIRAMINE MALEATE, PSEUDOEPHEDRINE HYDROCHLORIDE, AND DEXTROMETHORPHAN HYDROBROMIDE 2; 30; 10 MG/5ML; MG/5ML; MG/5ML
5 SYRUP ORAL 4 TIMES DAILY PRN
Qty: 120 ML | Refills: 0 | Status: SHIPPED | OUTPATIENT
Start: 2019-09-17

## 2019-09-17 NOTE — PROGRESS NOTES
"Sebastián Navas is a 59 y.o. female.   Chief Complaint   Patient presents with   • URI      58 yo female presents with complaint of \"little cough, sinus, aches, ears hurt at night\" onset last night.  She reports no fever, taking no medication prior to arrival at clinic.  Refer to HPI/ROS for additional information.      URI    This is a new problem. The current episode started yesterday. The problem has been waxing and waning. There has been no fever. Associated symptoms include congestion, coughing, ear pain (night only), a plugged ear sensation (sore at night) and a sore throat. She has tried nothing for the symptoms.        The following portions of the patient's history were reviewed and updated as appropriate: allergies, current medications, past family history, past medical history, past social history, past surgical history and problem list.    Current Outpatient Medications:   •  AFLURIA QUADRIVALENT 0.5 ML suspension prefilled syringe injection, inject 0.5 milliliter intramuscularly, Disp: , Rfl: 0  •  albuterol (PROVENTIL HFA;VENTOLIN HFA) 108 (90 Base) MCG/ACT inhaler, Inhale 2 puffs Every 4 (Four) Hours As Needed for Wheezing., Disp: , Rfl:   •  albuterol (PROVENTIL) (2.5 MG/3ML) 0.083% nebulizer solution, Take 2.5 mg by nebulization Every 4 (Four) Hours As Needed for Wheezing., Disp: , Rfl:   •  montelukast (SINGULAIR) 10 MG tablet, Take 1 tablet by mouth Every Night., Disp: 30 tablet, Rfl: 0  •  brompheniramine-pseudoephedrine-DM 30-2-10 MG/5ML syrup, Take 5 mL by mouth 4 (Four) Times a Day As Needed for Congestion, Cough or Allergies., Disp: 120 mL, Rfl: 0  •  diclofenac (VOLTAREN) 1 % gel gel, APPLY 2 3 GRAMS ON THE SKIN THREE TIMES DAILY. DISCONTINUE ALL OTHER ANTI INFLAMMATORY MEDICATIONS DURING USE, Disp: , Rfl: 2  •  fluticasone (FLONASE) 50 MCG/ACT nasal spray, 2 sprays into each nostril Daily., Disp: 1 bottle, Rfl: 0    Review of Systems   Constitutional: Negative.    HENT: Positive " "for congestion, ear pain (night only) and sore throat.    Eyes: Negative.    Respiratory: Positive for cough.    Cardiovascular: Negative.    Gastrointestinal: Negative.    Skin: Negative.    Psychiatric/Behavioral: Negative.      /86 (BP Location: Left arm, Patient Position: Sitting)   Pulse 73   Temp 98.5 °F (36.9 °C) (Oral)   Resp 18   Ht 170.2 cm (67\")   Wt 115 kg (254 lb)   SpO2 98%   BMI 39.78 kg/m²     Objective   Allergies   Allergen Reactions   • Ibuprofen Shortness Of Breath   • Codeine Hives       Physical Exam   Constitutional: She is oriented to person, place, and time. She appears well-developed and well-nourished. No distress.   HENT:   Head: Normocephalic.   Right Ear: External ear normal.   Left Ear: External ear normal.   Nose: Mucosal edema present. No sinus tenderness. Right sinus exhibits no maxillary sinus tenderness and no frontal sinus tenderness. Left sinus exhibits no maxillary sinus tenderness and no frontal sinus tenderness.   Mouth/Throat: Uvula is midline, oropharynx is clear and moist and mucous membranes are normal. Tonsils are 0 on the right. Tonsils are 0 on the left. No tonsillar exudate.   Eyes: Conjunctivae are normal.   Neck: Normal range of motion. Neck supple.   Cardiovascular: Normal rate, regular rhythm and normal heart sounds.   Pulmonary/Chest: Effort normal and breath sounds normal. No stridor. No respiratory distress. She has no wheezes. She has no rales. She exhibits no tenderness.   Neurological: She is alert and oriented to person, place, and time.   Skin: Skin is warm. Capillary refill takes less than 2 seconds. She is not diaphoretic.   Psychiatric: She has a normal mood and affect. Her behavior is normal. Thought content normal.   Vitals reviewed.      Assessment/Plan   Rehana was seen today for uri.    Diagnoses and all orders for this visit:    Viral upper respiratory tract infection    Other orders  -     brompheniramine-pseudoephedrine-DM " 30-2-10 MG/5ML syrup; Take 5 mL by mouth 4 (Four) Times a Day As Needed for Congestion, Cough or Allergies.             An After Visit Summary was printed, reviewed, and given to the patient. Understanding verbalized and agrees with treatment plan.  If no improvement or becomes worse, follow up with primary or go to [facility].        September 17, 2019 4:11 PM

## 2019-09-17 NOTE — PATIENT INSTRUCTIONS
"Upper Respiratory Infection, Adult  An upper respiratory infection (URI) affects the nose, throat, and upper air passages. URIs are caused by germs (viruses). The most common type of URI is often called \"the common cold.\"  Medicines cannot cure URIs, but you can do things at home to relieve your symptoms. URIs usually get better within 7-10 days.  Follow these instructions at home:  Activity  · Rest as needed.  · If you have a fever, stay home from work or school until your fever is gone, or until your doctor says you may return to work or school.  ? You should stay home until you cannot spread the infection anymore (you are not contagious).  ? Your doctor may have you wear a face mask so you have less risk of spreading the infection.  Relieving symptoms  · Gargle with a salt-water mixture 3-4 times a day or as needed. To make a salt-water mixture, completely dissolve ½-1 tsp of salt in 1 cup of warm water.  · Use a cool-mist humidifier to add moisture to the air. This can help you breathe more easily.  Eating and drinking    · Drink enough fluid to keep your pee (urine) pale yellow.  · Eat soups and other clear broths.  General instructions    · Take over-the-counter and prescription medicines only as told by your doctor. These include cold medicines, fever reducers, and cough suppressants.  · Do not use any products that contain nicotine or tobacco. These include cigarettes and e-cigarettes. If you need help quitting, ask your doctor.  · Avoid being where people are smoking (avoid secondhand smoke).  · Make sure you get regular shots and get the flu shot every year.  · Keep all follow-up visits as told by your doctor. This is important.  How to avoid spreading infection to others    · Wash your hands often with soap and water. If you do not have soap and water, use hand .  · Avoid touching your mouth, face, eyes, or nose.  · Cough or sneeze into a tissue or your sleeve or elbow. Do not cough or sneeze " "into your hand or into the air.  Contact a doctor if:  · You are getting worse, not better.  · You have any of these:  ? A fever.  ? Chills.  ? Brown or red mucus in your nose.  ? Yellow or brown fluid (discharge)coming from your nose.  ? Pain in your face, especially when you bend forward.  ? Swollen neck glands.  ? Pain with swallowing.  ? White areas in the back of your throat.  Get help right away if:  · You have shortness of breath that gets worse.  · You have very bad or constant:  ? Headache.  ? Ear pain.  ? Pain in your forehead, behind your eyes, and over your cheekbones (sinus pain).  ? Chest pain.  · You have long-lasting (chronic) lung disease along with any of these:  ? Wheezing.  ? Long-lasting cough.  ? Coughing up blood.  ? A change in your usual mucus.  · You have a stiff neck.  · You have changes in your:  ? Vision.  ? Hearing.  ? Thinking.  ? Mood.  Summary  · An upper respiratory infection (URI) is caused by a germ called a virus. The most common type of URI is often called \"the common cold.\"  · URIs usually get better within 7-10 days.  · Take over-the-counter and prescription medicines only as told by your doctor.  This information is not intended to replace advice given to you by your health care provider. Make sure you discuss any questions you have with your health care provider.  Document Released: 06/05/2009 Document Revised: 08/10/2018 Document Reviewed: 08/10/2018  [employer] Interactive Patient Education © 2019 [employer]    "

## 2019-09-20 ENCOUNTER — TREATMENT (OUTPATIENT)
Dept: PHYSICAL THERAPY | Facility: CLINIC | Age: 59
End: 2019-09-20

## 2019-09-20 DIAGNOSIS — M17.11 PRIMARY OSTEOARTHRITIS OF ONE KNEE, RIGHT: Primary | ICD-10-CM

## 2019-09-20 DIAGNOSIS — M72.2 PLANTAR FASCIAL FIBROMATOSIS OF LEFT FOOT: ICD-10-CM

## 2019-09-20 PROCEDURE — 97110 THERAPEUTIC EXERCISES: CPT | Performed by: PHYSICAL THERAPIST

## 2019-09-20 NOTE — PROGRESS NOTES
Physical Therapy Re- Assessment/Certification and Discharge Note    Date: 2019    Patient: Rehana Navas  : 1960  Medical Record #: [id number]  Referring Provider: Gabi Ruiz, *    Visit Diagnosis/ICD-10 Code: Primary osteoarthritis of one knee, right [M17.11]  Patient seen for 13 sessions    Subjective   Patient states her right knee pain is about the same overall, reports f/u with PCP went well and will be scheduled to have Rooster injection possibly end of Oct, pending approval. She states her knee brace fits much better.     Pain Scale (0-10): 2/10  Functional Outcome Measure: LEFS: 54/80  Clinical Progress: improved  Home Program Compliance: Yes  Treatment has included: therapeutic exercise    Objective       Observations     Additional Observation Details  Improved brace fit on arrival noted. No swelling noted along right knee complex.     Tenderness     Right Knee   Tenderness in the lateral joint line.     Additional Tenderness Details  Denies increased TTP along knee except lateral joint line.     Active Range of Motion     Right Knee   Flexion: 120 degrees   Extension: 0 degrees     Patellar Mobility     Right Knee Patellar tendons within functional limits include the medial, lateral, superior and inferior.     Strength/Myotome Testing     Left Knee   Flexion: 5  Extension: 5    Right Knee   Flexion: 5  Extension: 4+    General Comments     Knee Comments  Pt ambulated with step through gait pattern, equal step length and arm swing. Able to ascend/descend 13 stairs without brace donned (using handrail for support) with reciprocal gait.        Treatment/Procedures/Modalities:   Manual Therapy: 0 Minutes  Therapeutic Exercise: 31 Minutes   Neuromuscular Re-Education: 0 Minutes   Therapeutic Activity: 0 Minutes  Gait Trainin Minutes   Moist Heat:    Ice:    E-Stim:    Ultrasound:    Ionto:   Traction:      Timed Code Treatment: 31 Minutes  Total Treatment Time: 31 Minutes    Goals:    Progress toward previous goals: Partially Met  Recommendations: Discharge  Prognosis to achieve goals: good    Assessment / Plan:   Patient verbalized compliance with HEP, demonstrated functional right knee AROM, noted 0-120 degrees and improved stair climbing mechanics. Improved right knee brace fit noted, denies mechanical symptoms in clinic. Pt has f/u with PCP for injection, indep with HEP. Appropriate to continue with HEP indep and d/c from OPPT.   Assessment & Plan       Goals  Plan Goals: PT Short Term Goals    STG Date to Achieve  08/27/19  CP     STG 1  Patient reports foot/ankle pain is reduced by at least 25%.  CP     STG 1 Progress  Met  CP     STG 2  Patient is independent with HEP for flexibility and strengthening.  CP STG 2 Progress  Met  CP     STG 3  Pt will be educated on custom orthotic and night splint option for improved medial arch support ambulating and stretch at night.   CP     STG 3 Progress  Met  CP     STG 4  Patient will negotiate stairs safely with least restrictive device or compensations  CP     STG 4 Progress  Met  CP               Long Term Goals   LTG Date to Achieve  4 weeks  CP     LTG 1  LEFS score is improved by at least 10 points to demonstrated improved functional mobility.  CP     LTG 1 Progress  Not Met  CP     LTG 2  Patient will demonstrate independent HEP progressed for closed chain strength, proprioception, balance and agility with minimal or no compensations or exacerbations  CP     LTG 2 Progress Met CP     LTG 3  Pt will demonstrate functional left ankle AROM without increased pain for improved mobility.   CP     LTG 3 Progress Met  CP     LTG 4  Patient will increase active knee flexion to 120 deg for ease in getting in and out of car and up/down bus stairs.  CP     LTG 4 Progress  Met  CP     LTG 5  Pt will ascend and descend 8 stairs with reciprocal gait pattern, 1 handrail for support, with pain less than or equal to 1-2/10.   CP     LTG 5 Progress Met   CP                I certify that the therapy services are furnished while this patient is under my care.  The services outlined above are required by this patient, and will be reviewed every 90 days.    Corinne E. Perkins, PT  Physical Therapist    Please sign and return via fax to [phone].. Thank you, [facility].

## 2020-09-26 ENCOUNTER — FLU SHOT (OUTPATIENT)
Dept: INTERNAL MEDICINE | Facility: CLINIC | Age: 60
End: 2020-09-26

## 2020-09-26 DIAGNOSIS — Z23 NEED FOR INFLUENZA VACCINATION: ICD-10-CM

## 2020-09-26 PROCEDURE — 90471 IMMUNIZATION ADMIN: CPT | Performed by: NURSE PRACTITIONER

## 2020-09-26 PROCEDURE — 90686 IIV4 VACC NO PRSV 0.5 ML IM: CPT | Performed by: NURSE PRACTITIONER

## 2021-10-09 ENCOUNTER — FLU SHOT (OUTPATIENT)
Dept: INTERNAL MEDICINE | Facility: CLINIC | Age: 61
End: 2021-10-09

## 2021-10-09 DIAGNOSIS — Z23 NEED FOR INFLUENZA VACCINATION: Primary | ICD-10-CM

## 2021-10-09 PROCEDURE — 90471 IMMUNIZATION ADMIN: CPT | Performed by: INTERNAL MEDICINE

## 2021-10-09 PROCEDURE — 90686 IIV4 VACC NO PRSV 0.5 ML IM: CPT | Performed by: INTERNAL MEDICINE

## 2022-10-01 ENCOUNTER — FLU SHOT (OUTPATIENT)
Dept: INTERNAL MEDICINE | Facility: CLINIC | Age: 62
End: 2022-10-01

## 2022-10-01 DIAGNOSIS — Z23 NEED FOR INFLUENZA VACCINATION: Primary | ICD-10-CM

## 2022-10-01 PROCEDURE — 90686 IIV4 VACC NO PRSV 0.5 ML IM: CPT | Performed by: INTERNAL MEDICINE

## 2022-10-01 PROCEDURE — 90471 IMMUNIZATION ADMIN: CPT | Performed by: INTERNAL MEDICINE

## 2024-10-03 ENCOUNTER — FLU SHOT (OUTPATIENT)
Dept: INTERNAL MEDICINE | Facility: CLINIC | Age: 64
End: 2024-10-03
Payer: MEDICAID

## 2024-10-03 DIAGNOSIS — Z23 NEED FOR INFLUENZA VACCINATION: Primary | ICD-10-CM

## 2024-10-03 PROCEDURE — 90656 IIV3 VACC NO PRSV 0.5 ML IM: CPT | Performed by: INTERNAL MEDICINE

## 2024-10-03 PROCEDURE — 90471 IMMUNIZATION ADMIN: CPT | Performed by: INTERNAL MEDICINE
